# Patient Record
Sex: MALE | Race: ASIAN | NOT HISPANIC OR LATINO | Employment: UNEMPLOYED | URBAN - METROPOLITAN AREA
[De-identification: names, ages, dates, MRNs, and addresses within clinical notes are randomized per-mention and may not be internally consistent; named-entity substitution may affect disease eponyms.]

---

## 2017-01-03 ENCOUNTER — OFFICE VISIT - HEALTHEAST (OUTPATIENT)
Dept: FAMILY MEDICINE | Facility: CLINIC | Age: 57
End: 2017-01-03

## 2017-01-03 ENCOUNTER — COMMUNICATION - HEALTHEAST (OUTPATIENT)
Dept: FAMILY MEDICINE | Facility: CLINIC | Age: 57
End: 2017-01-03

## 2017-01-03 DIAGNOSIS — E11.9 TYPE 2 DIABETES MELLITUS WITHOUT COMPLICATION (H): ICD-10-CM

## 2017-01-03 DIAGNOSIS — Z01.818 PREOP EXAMINATION: ICD-10-CM

## 2017-01-03 DIAGNOSIS — H26.9 CATARACT: ICD-10-CM

## 2017-01-03 DIAGNOSIS — E11.9 TYPE 2 DIABETES MELLITUS WITHOUT COMPLICATION, WITHOUT LONG-TERM CURRENT USE OF INSULIN (H): ICD-10-CM

## 2017-01-03 LAB — HBA1C MFR BLD: 12.8 % (ref 3.5–6)

## 2017-01-03 RX ORDER — PREDNISOLONE ACETATE 10 MG/ML
SUSPENSION/ DROPS OPHTHALMIC
Status: SHIPPED | COMMUNITY
Start: 2016-12-10 | End: 2022-12-21

## 2017-01-03 RX ORDER — MOXIFLOXACIN HCL 0.5 %
DROPS OPHTHALMIC (EYE)
Status: SHIPPED | COMMUNITY
Start: 2016-12-10 | End: 2022-12-21

## 2017-01-03 RX ORDER — KETOROLAC TROMETHAMINE 4 MG/ML
SOLUTION/ DROPS OPHTHALMIC
Status: SHIPPED | COMMUNITY
Start: 2016-12-10 | End: 2022-12-21

## 2017-01-03 ASSESSMENT — MIFFLIN-ST. JEOR: SCORE: 1307.74

## 2017-05-09 ENCOUNTER — AMBULATORY - HEALTHEAST (OUTPATIENT)
Dept: EDUCATION SERVICES | Facility: CLINIC | Age: 57
End: 2017-05-09

## 2021-05-30 VITALS — WEIGHT: 130 LBS | HEIGHT: 64 IN | BODY MASS INDEX: 22.2 KG/M2

## 2021-06-08 NOTE — PROGRESS NOTES
Assessment/Plan:      Visit for Preoperative Exam.      1. Preop examination  Glycosylated Hemoglobin A1c    Hemoglobin   2. Cataract     3. Type 2 diabetes mellitus without complication     4. Type 2 diabetes mellitus without complication, without long-term current use of insulin  metFORMIN (GLUCOPHAGE) 500 MG tablet    Ambulatory referral to Diabetes Education (Existing Diagnosis)         This is a 57 yo male with longstanding poorly controlled diabetes (doesn't check blood sugars at home and last A1c > 14.0 - on 12/7/16, now 12.8).  Has vision impairing cataracts - scheduled for surgical repair.  Other than poorly controlled sugars, OK for surgery.  Will increase patient's Metformin treatment from 500 mg BID to 1000 mg BID and refer to diabetes educator as well.  Ok from my standpoint for surgery - discussed with patient that his surgery may be delayed by surgeon/anesthesiologist due to poorly controlled blood sugars.      Subjective:     Scheduled Procedure: Bilateral cataract surgery  Surgery Date:  01/09/2017  Surgery Location:  [facility]  Surgeon:  Dr. Rodriguez    57 yo male with bilateral cataract, scheduled for right cataract repair on 1/9/17 and left cataract repair on 2/6/17.  Has vision impairment related to cataracts.  Also has underlying diabetes - poorly controlled.      Current Outpatient Prescriptions   Medication Sig Dispense Refill     metFORMIN (GLUCOPHAGE) 500 MG tablet Take 2 tablets (1,000 mg total) by mouth 2 (two) times a day with meals. 120 tablet 1     ketorolac (ACULAR LS) 0.4 % Drop        prednisoLONE acetate (PRED-FORTE) 1 % ophthalmic suspension        VIGAMOX 0.5 % ophthalmic solution        No current facility-administered medications for this visit.        No Known Allergies      There is no immunization history on file for this patient.    Patient Active Problem List   Diagnosis     Type 2 diabetes mellitus without complication     Cataract       History reviewed. No  pertinent past medical history.    Social History     Social History     Marital status: Single     Spouse name: N/A     Number of children: N/A     Years of education: N/A     Occupational History     not currently employed      Social History Main Topics     Smoking status: Current Every Day Smoker     Types: Cigarettes     Smokeless tobacco: Never Used      Comment: 10 cigs per day     Alcohol use No     Drug use: No     Sexual activity: No     Other Topics Concern     Not on file     Social History Narrative    Lives with girlfriend; shares home with sister    No children    Born in [facility], spent time in [place]; to  1980       Past Surgical History   Procedure Laterality Date     No past surgeries         History of Present Illness  Recent Health  Fever: no  Chills: no  Fatigue: no  Chest Pain: no  Cough: no  Dyspnea: no  Urinary Frequency: yes, a little more than usual  Nausea: no  Vomiting: no  Diarrhea: no  Abdominal Pain: no  Easy Bruising: no  Lower Extremity Swelling: no  Poor Exercise Tolerance: no    Most recent Health Maintenance Visit:  6 month(s) ago    Pertinent History  Prior Anesthesia: no  Previous Anesthesia Reaction:  unknown  Diabetes: yes  Cardiovascular Disease: no  Pulmonary Disease: no  Renal Disease: no  GI Disease: no  Sleep Apnea: no  Thromboembolic Problems: no  Clotting Disorder: no  Bleeding Disorder: no  Transfusion Reaction: unknown  Impaired Immunity: no  Steroid use in the last 6 months: no  Frequent Aspirin use: no    Family history of   Family History   Problem Relation Age of Onset     Diabetes type II Mother          Social history of patient does not wear denture or partial plates, the patient refuses transfusion and there are no concerns regarding care after surgery    After surgery, the patient plans to recover at home with family.    Review of Systems  Pertinent positives as noted in HPI; otherwise 12 point ROS negative.            Objective:         Vitals:  "   01/03/17 0948   BP: 115/56   Pulse: 96   Resp: 20   Temp: 98.1  F (36.7  C)   TempSrc: Oral   Weight: 130 lb (59 kg)   Height: 5' 3.5\" (1.613 m)       Physical Exam:  EXAM:  Visit Vitals     /56 (Patient Site: Left Arm, Patient Position: Sitting, Cuff Size: Adult Regular)     Pulse 96     Temp 98.1  F (36.7  C) (Oral)     Resp 20     Ht 5' 3.5\" (1.613 m)     Wt 130 lb (59 kg)     BMI 22.67 kg/m2      Gen:  NAD, appears well, well-hydrated  HEENT:  TMs nl, oropharynx benign, nasal mucosa nl, conjunctiva clear, bilateral cataracts  Neck:  Supple, no adenopathy, no thyromegaly, no carotid bruits, no JVD  Lungs:  Clear to auscultation bilaterally  Cor:  RRR no murmur  Abd:  Soft, nontender, BS+, no masses, no guarding or rebound, no HSM  Extr:  Neg.  Neuro:  No asymmetry  Skin:  Warm/dry         Results for orders placed or performed in visit on 01/03/17   Glycosylated Hemoglobin A1c   Result Value Ref Range    Hemoglobin A1c 12.8 (H) 3.5 - 6.0 %   Hemoglobin   Result Value Ref Range    Hemoglobin 14.0 14.0 - 18.0 g/dL       "

## 2021-06-10 NOTE — PROGRESS NOTES
Patient did not show for today's dm education appt.  Marquise in house  called patient yesterday to leave reminder message regarding today's appt, unable to reach, left message.  Called today given no show, states he is no longer coming to the [facility] for his medical care, seeing Dr. Souza.  Will contact registrars to update profile.

## 2022-07-11 ENCOUNTER — TRANSFERRED RECORDS (OUTPATIENT)
Dept: HEALTH INFORMATION MANAGEMENT | Facility: CLINIC | Age: 62
End: 2022-07-11

## 2022-10-25 ENCOUNTER — TRANSFERRED RECORDS (OUTPATIENT)
Dept: HEALTH INFORMATION MANAGEMENT | Facility: CLINIC | Age: 62
End: 2022-10-25

## 2022-12-19 ENCOUNTER — OFFICE VISIT (OUTPATIENT)
Dept: FAMILY MEDICINE | Facility: CLINIC | Age: 62
End: 2022-12-19
Payer: COMMERCIAL

## 2022-12-19 VITALS
RESPIRATION RATE: 20 BRPM | SYSTOLIC BLOOD PRESSURE: 175 MMHG | BODY MASS INDEX: 24.41 KG/M2 | DIASTOLIC BLOOD PRESSURE: 96 MMHG | OXYGEN SATURATION: 100 % | HEART RATE: 99 BPM | WEIGHT: 140 LBS

## 2022-12-19 DIAGNOSIS — E11.9 TYPE 2 DIABETES MELLITUS WITHOUT COMPLICATION, WITH LONG-TERM CURRENT USE OF INSULIN (H): ICD-10-CM

## 2022-12-19 DIAGNOSIS — Z01.818 PREOP GENERAL PHYSICAL EXAM: Primary | ICD-10-CM

## 2022-12-19 DIAGNOSIS — Z11.4 SCREENING FOR HIV (HUMAN IMMUNODEFICIENCY VIRUS): ICD-10-CM

## 2022-12-19 DIAGNOSIS — Z13.220 SCREENING FOR HYPERLIPIDEMIA: ICD-10-CM

## 2022-12-19 DIAGNOSIS — Z11.59 NEED FOR HEPATITIS C SCREENING TEST: ICD-10-CM

## 2022-12-19 DIAGNOSIS — N18.31 STAGE 3A CHRONIC KIDNEY DISEASE (H): ICD-10-CM

## 2022-12-19 DIAGNOSIS — Z79.4 TYPE 2 DIABETES MELLITUS WITHOUT COMPLICATION, WITH LONG-TERM CURRENT USE OF INSULIN (H): ICD-10-CM

## 2022-12-19 DIAGNOSIS — I10 BENIGN ESSENTIAL HYPERTENSION: ICD-10-CM

## 2022-12-19 PROBLEM — L08.9 FOOT INFECTION: Status: ACTIVE | Noted: 2022-08-11

## 2022-12-19 PROBLEM — K20.80 ESOPHAGITIS, LOS ANGELES GRADE D: Status: ACTIVE | Noted: 2019-09-29

## 2022-12-19 PROBLEM — E11.10 DKA (DIABETIC KETOACIDOSIS) (H): Status: ACTIVE | Noted: 2019-09-29

## 2022-12-19 LAB — HBA1C MFR BLD: 7.5 % (ref 0–5.6)

## 2022-12-19 PROCEDURE — 36415 COLL VENOUS BLD VENIPUNCTURE: CPT

## 2022-12-19 PROCEDURE — 80048 BASIC METABOLIC PNL TOTAL CA: CPT

## 2022-12-19 PROCEDURE — 87389 HIV-1 AG W/HIV-1&-2 AB AG IA: CPT

## 2022-12-19 PROCEDURE — 86803 HEPATITIS C AB TEST: CPT

## 2022-12-19 PROCEDURE — 80061 LIPID PANEL: CPT

## 2022-12-19 PROCEDURE — 83036 HEMOGLOBIN GLYCOSYLATED A1C: CPT

## 2022-12-19 PROCEDURE — 99204 OFFICE O/P NEW MOD 45 MIN: CPT | Mod: GC

## 2022-12-19 RX ORDER — LOSARTAN POTASSIUM 25 MG/1
25 TABLET ORAL DAILY
Qty: 30 TABLET | Refills: 0 | Status: SHIPPED | OUTPATIENT
Start: 2022-12-19 | End: 2023-01-18

## 2022-12-19 RX ORDER — INSULIN GLARGINE 100 [IU]/ML
28 INJECTION, SOLUTION SUBCUTANEOUS AT BEDTIME
COMMUNITY
End: 2024-02-14

## 2022-12-19 NOTE — PROGRESS NOTES
[facility] [address] [place] [address]  Phone: [phone]  Fax: [phone]  Primary Provider: Abhi Painting  Pre-op Performing Provider: ABHI PAINTING  [id number]  PREOPERATIVE EVALUATION:  Today's date: 12/19/2022    Chace Lane is a 62 year old male who presents for a preoperative evaluation.    Surgical Information:  Surgery/Procedure: Cataract Procedure  Surgery Location: [facility]  Surgeon: Dr. Borrego  Surgery Date: 1/20/23  Time of Surgery: morning  Where patient plans to recover: At home with family  Fax number for surgical facility: [phone]    Type of Anesthesia Anticipated: Local    Assessment & Plan     The proposed surgical procedure is considered LOW risk.    Preop general physical exam  Patient here to establish care and pre-op exam for cataract surgery. Previously saw Dr. Armond Souza who is now retired. Patient undergoing cataract repair in a month. Patient cleared for surgery. Low risk procedure. Does not need to hold any medications. See below for further chronic disease discussion.    Benign essential hypertension  Patient reports having blood pressure medications at home but doesn't take them. Review of transferred records show SBP of 150s and multiple office visits with previous PCP. Discussed importance of medication adherence even if feeling well. Patient unsure what BP medication he has at home and not in records. Patient to dispose of home bp medication. Will start Losartan and follow-up in 2 weeks for BP check and repeat BMP.  - losartan (COZAAR) 25 MG tablet; Take 1 tablet (25 mg) by mouth daily    Type 2 diabetes mellitus without complication (H)  Patient on 34U of lantus per report and metformin. Last A1c on paperchart was 8.2 after recent initiation of lantus. Will repeat A1c today.   - Hemoglobin A1c; Future  - Hemoglobin A1c    Stage 3a chronic kidney disease (H)  Review of paper records shows elevated Cr of 1.5 on multiple  checks. Patient unaware of this. Briefly discussed CKD in setting of hypertension and DM. Will follow-up BMP at next visit given losartan initiation.   - Basic metabolic panel; Future  - Basic metabolic panel    Screening for HIV (human immunodeficiency virus)  Due for routine screening which was obtained today.  - HIV Antigen Antibody Combo; Future  - HIV Antigen Antibody Combo    Need for hepatitis C screening test  Due for routine screening which was obtained today.  - Hepatitis C antibody; Future  - Hepatitis C antibody    Screening for hyperlipidemia  Due for routine screening which was obtained today. Lipid from previous provider showed cholesterol of 202. Will recheck lipid panel today.  - Lipid Profile; Future  - Lipid Profile      Risks and Recommendations:  The patient has the following additional risks and recommendations for perioperative complications:   - No identified additional risk factors other than previously addressed    No medications needing to be held.    RECOMMENDATION:  APPROVAL GIVEN to proceed with proposed procedure, without further diagnostic evaluation.      Subjective     HPI related to upcoming procedure:   Blurred vision for some time that's now worsening. Diagnosed with cataracts 5+ years ago.     Also here to establish care. Patient with known hypertension, DM, and CKD. No BP medications. On lantus 34U and metformin    No flowsheet data found.    Health Care Directive:  Patient does not have a Health Care Directive or Living Will: Discussed advance care planning with patient; information given to patient to review.    Status of Chronic Conditions:  DIABETES - Patient has a longstanding history of DiabetesType Type II . Patient is being treated with oral agents and insulin injections and denies significant side effects. Control has been fair. Complicating factors include but are not limited to: hypertension and chronic kidney disease.     HYPERTENSION - Patient has longstanding  history of HTN , currently denies any symptoms referable to elevated blood pressure. Specifically denies chest pain, palpitations, dyspnea, orthopnea, PND or peripheral edema. Blood pressure readings have not been in normal range. Not on medications.    RENAL INSUFFICIENCY - Patient has a longstanding history of moderate-severe chronic renal insufficiency. Last Cr 1.5.       Review of Systems  CONSTITUTIONAL: NEGATIVE for fever, chills, change in weight  ENT/MOUTH: NEGATIVE for ear, mouth and throat problems  RESP: NEGATIVE for significant cough or SOB  CV: NEGATIVE for chest pain, palpitations or peripheral edema    Patient Active Problem List    Diagnosis Date Noted     Type 2 diabetes mellitus without complication (H) 09/14/2016     Priority: Medium     Cataract 09/14/2016     Priority: Medium      No past medical history on file.  Past Surgical History:   Procedure Laterality Date     NO PAST SURGERIES       Current Outpatient Medications   Medication Sig Dispense Refill     insulin glargine (LANTUS VIAL) 100 UNIT/ML vial Inject 34 Units Subcutaneous At Bedtime       metFORMIN (GLUCOPHAGE) 500 MG tablet [METFORMIN (GLUCOPHAGE) 500 MG TABLET] Take 2 tablets (1,000 mg total) by mouth 2 (two) times a day with meals. 120 tablet 1     ketorolac (ACULAR LS) 0.4 % Drop [KETOROLAC (ACULAR LS) 0.4 % DROP]  (Patient not taking: Reported on 12/19/2022)       prednisoLONE acetate (PRED-FORTE) 1 % ophthalmic suspension [PREDNISOLONE ACETATE (PRED-FORTE) 1 % OPHTHALMIC SUSPENSION]  (Patient not taking: Reported on 12/19/2022)       VIGAMOX 0.5 % ophthalmic solution [VIGAMOX 0.5 % OPHTHALMIC SOLUTION]  (Patient not taking: Reported on 12/19/2022)         No Known Allergies     Social History     Tobacco Use     Smoking status: Every Day     Types: Cigarettes     Smokeless tobacco: Never     Tobacco comments:     10 cigs per day   Substance Use Topics     Alcohol use: No       History   Drug Use No         Objective     BP (!)  175/96   Pulse 99   Resp 20   Wt 63.5 kg (140 lb)   SpO2 100%   BMI 24.41 kg/m      Physical Exam  GENERAL APPEARANCE: healthy, alert and no distress  HENT: ear canals and TM's normal and nose and mouth without ulcers or lesions  RESP: lungs clear to auscultation - no rales, rhonchi or wheezes  CV: regular rate and rhythm, normal S1 S2, no S3 or S4 and no murmur, click or rub   ABDOMEN: soft, nontender, no HSM or masses and bowel sounds normal  NEURO: Normal strength and tone, sensory exam grossly normal, mentation intact and speech normal    No results for input(s): HGB, PLT, INR, NA, POTASSIUM, CR, A1C in the last 40468 hours.     Diagnostics:  No labs were ordered during this visit.   No EKG required for low risk surgery (cataract, skin procedure, breast biopsy, etc).    Revised Cardiac Risk Index (RCRI):  The patient has the following serious cardiovascular risks for perioperative complications:   - No serious cardiac risks = 0 points     RCRI Interpretation: 0 points: Class I (very low risk - 0.4% complication rate)           Signed Electronically by: Dianne Painting MD  Copy of this evaluation report is provided to requesting physician.

## 2022-12-19 NOTE — LETTER
December 27, 2022      GORAN Lane  [address] [place] [address]        Dear ,    We are writing to inform you of your test results.    Thank you for visiting our clinic on Dec 19, 2022!     The result of your recent labwork has returned. As discussed your A1c is at 7.5 which is improved from your last check with Dr. Souza. Your kidney function is unchanged from last check with Dr. Souza. Cholesterol is improved from last check with Dr. Souza. HIV and Hepatitis C is negative which is good -- nothing further to do there.  I will see you at your follow-up visit in January for further blood pressure and diabetes discussion.    Resulted Orders   Basic metabolic panel   Result Value Ref Range    Sodium 137 136 - 145 mmol/L    Potassium 4.6 3.4 - 5.3 mmol/L    Chloride 103 98 - 107 mmol/L    Carbon Dioxide (CO2) 28 22 - 29 mmol/L    Anion Gap 6 (L) 7 - 15 mmol/L    Urea Nitrogen 33.5 (H) 8.0 - 23.0 mg/dL    Creatinine 1.55 (H) 0.67 - 1.17 mg/dL    Calcium 10.0 8.8 - 10.2 mg/dL    Glucose 199 (H) 70 - 99 mg/dL    GFR Estimate 51 (L) >60 mL/min/1.73m2      Comment:      Effective December 21, 2021 eGFRcr in adults is calculated using the 2021 CKD-EPI creatinine equation which includes age and gender ([employer] et al., NE, DOI: 10.1056/RAAVfx0693804)   Hemoglobin A1c   Result Value Ref Range    Hemoglobin A1C 7.5 (H) 0.0 - 5.6 %      Comment:      Normal <5.7%   Prediabetes 5.7-6.4%    Diabetes 6.5% or higher     Note: Adopted from ADA consensus guidelines.   Lipid Profile   Result Value Ref Range    Cholesterol 184 <200 mg/dL    Triglycerides 146 <150 mg/dL    Direct Measure HDL 48 >=40 mg/dL    LDL Cholesterol Calculated 107 (H) <=100 mg/dL    Non HDL Cholesterol 136 (H) <130 mg/dL    Narrative    Cholesterol  Desirable:  <200 mg/dL    Triglycerides  Normal:  Less than 150 mg/dL  Borderline High:  150-199 mg/dL  High:  200-499 mg/dL  Very High:  Greater than or equal to 500 mg/dL    Direct Measure HDL  Female:  Greater  than or equal to 50 mg/dL   Male:  Greater than or equal to 40 mg/dL    LDL Cholesterol  Desirable:  <100mg/dL  Above Desirable:  100-129 mg/dL   Borderline High:  130-159 mg/dL   High:  160-189 mg/dL   Very High:  >= 190 mg/dL    Non HDL Cholesterol  Desirable:  130 mg/dL  Above Desirable:  130-159 mg/dL  Borderline High:  160-189 mg/dL  High:  190-219 mg/dL  Very High:  Greater than or equal to 220 mg/dL   HIV Antigen Antibody Combo   Result Value Ref Range    HIV Antigen Antibody Combo Nonreactive Nonreactive      Comment:      HIV-1 p24 Ag & HIV-1/HIV-2 Ab Not Detected   Hepatitis C antibody   Result Value Ref Range    Hepatitis C Antibody Nonreactive Nonreactive    Narrative    Assay performance characteristics have not been established for newborns, infants, and children.       If you have any questions or concerns, please call the clinic at the number listed above.       Sincerely,      Dr. Painting

## 2022-12-20 LAB
ANION GAP SERPL CALCULATED.3IONS-SCNC: 6 MMOL/L (ref 7–15)
BUN SERPL-MCNC: 33.5 MG/DL (ref 8–23)
CALCIUM SERPL-MCNC: 10 MG/DL (ref 8.8–10.2)
CHLORIDE SERPL-SCNC: 103 MMOL/L (ref 98–107)
CHOLEST SERPL-MCNC: 184 MG/DL
CREAT SERPL-MCNC: 1.55 MG/DL (ref 0.67–1.17)
DEPRECATED HCO3 PLAS-SCNC: 28 MMOL/L (ref 22–29)
GFR SERPL CREATININE-BSD FRML MDRD: 51 ML/MIN/1.73M2
GLUCOSE SERPL-MCNC: 199 MG/DL (ref 70–99)
HCV AB SERPL QL IA: NONREACTIVE
HDLC SERPL-MCNC: 48 MG/DL
HIV 1+2 AB+HIV1 P24 AG SERPL QL IA: NONREACTIVE
LDLC SERPL CALC-MCNC: 107 MG/DL
NONHDLC SERPL-MCNC: 136 MG/DL
POTASSIUM SERPL-SCNC: 4.6 MMOL/L (ref 3.4–5.3)
SODIUM SERPL-SCNC: 137 MMOL/L (ref 136–145)
TRIGL SERPL-MCNC: 146 MG/DL

## 2022-12-21 PROBLEM — N18.9 CHRONIC KIDNEY DISEASE, UNSPECIFIED CKD STAGE: Status: ACTIVE | Noted: 2022-12-21

## 2022-12-28 NOTE — PATIENT INSTRUCTIONS
Pre-op faxed to fax number :  [phone]   Location :  [facility]  Date of Surgery :  1/20/2023  By/Date :  Nathaly 12/28/2022

## 2023-01-18 ENCOUNTER — OFFICE VISIT (OUTPATIENT)
Dept: FAMILY MEDICINE | Facility: CLINIC | Age: 63
End: 2023-01-18
Payer: COMMERCIAL

## 2023-01-18 VITALS
RESPIRATION RATE: 20 BRPM | BODY MASS INDEX: 26.22 KG/M2 | OXYGEN SATURATION: 99 % | WEIGHT: 148 LBS | HEART RATE: 95 BPM | SYSTOLIC BLOOD PRESSURE: 187 MMHG | DIASTOLIC BLOOD PRESSURE: 98 MMHG | HEIGHT: 63 IN

## 2023-01-18 DIAGNOSIS — I10 BENIGN ESSENTIAL HYPERTENSION: ICD-10-CM

## 2023-01-18 DIAGNOSIS — Z01.818 PREOP GENERAL PHYSICAL EXAM: Primary | ICD-10-CM

## 2023-01-18 PROCEDURE — 99214 OFFICE O/P EST MOD 30 MIN: CPT | Mod: GC

## 2023-01-18 RX ORDER — LOSARTAN POTASSIUM 25 MG/1
25 TABLET ORAL DAILY
Qty: 30 TABLET | Refills: 0 | Status: SHIPPED | OUTPATIENT
Start: 2023-01-18 | End: 2023-03-22

## 2023-01-18 NOTE — PROGRESS NOTES
[facility] [address] [place] [address]  Phone: [phone]  Fax: [phone]  Primary Provider: Abhi Sesay  Pre-op Performing Provider: ABHI SESAY    PREOPERATIVE EVALUATION:  Today's date: 1/18/2023    Chace Lane is a 62 year old male who presents for a preoperative evaluation.    Surgical Information:  Surgery/Procedure: Left eye Cataract   Surgery Location: [facility]  Surgeon: Unknown  Surgery Date: 2/8/2023  Time of Surgery: unknown  Where patient plans to recover: At home with family  Fax number for surgical facility: [phone]    Type of Anesthesia Anticipated: Local    Assessment & Plan     The proposed surgical procedure is considered LOW risk.    Preop general physical exam  Patient here for 2nd pre-op for left eye cataract surgery on 2/8. Discussed halving insulin dose if asked to fast prior to surgery. Otherwise continue all other medications.   -cleared for surgery  -half insulin if fasting  -continue all other medications    Benign essential hypertension  Elevated blood pressure at last visit. Has not started BP medications prescribed last visit. Will refill medication. Discussed risks/complications of hypertension. Encouraged to continue walking and watching food intake as he already has been.  - losartan (COZAAR) 25 MG tablet; Take 1 tablet (25 mg) by mouth daily  - follow up in 2 weeks for BMP and BP recheck      RECOMMENDATION:  APPROVAL GIVEN to proceed with proposed procedure, without further diagnostic evaluation.      Subjective     HPI related to upcoming procedure:   Blurred vision for some time that's now worsening. Diagnosed with cataracts 5+ years ago.     Saw at the end of December for right eye pre-op. Here today for left eye. No changes since that time. Unsure if he needs to fast. Plans to ask surgery prior to procedure.    Preop Questions 1/18/2023   1. Have you ever had a heart attack or stroke? No   2. Have you ever had  surgery on your heart or blood vessels, such as a stent placement, a coronary artery bypass, or surgery on an artery in your head, neck, heart, or legs? No   3. Do you have chest pain with activity? No   4. Do you have a history of  heart failure? No   5. Do you currently have a cold, bronchitis or symptoms of other infection? No   6. Do you have a cough, shortness of breath, or wheezing? No   7. Do you or anyone in your family have previous history of blood clots? No   8. Do you or does anyone in your family have a serious bleeding problem such as prolonged bleeding following surgeries or cuts? No   9. Have you ever had problems with anemia or been told to take iron pills? No   10. Have you had any abnormal blood loss such as black, tarry or bloody stools? No   11. Have you ever had a blood transfusion? No   12. Are you willing to have a blood transfusion if it is medically needed before, during, or after your surgery? NO -    13. Have you or any of your relatives ever had problems with anesthesia? No   14. Do you have sleep apnea, excessive snoring or daytime drowsiness? No   15. Do you have any artifical heart valves or other implanted medical devices like a pacemaker, defibrillator, or continuous glucose monitor? No   16. Do you have artificial joints? No   17. Are you allergic to latex? No       Health Care Directive:  Patient does not have a Health Care Directive or Living Will: Discussed advance care planning with patient; however, patient declined at this time.    Preoperative Review of :   reviewed - no record of controlled substances prescribed.      Review of Systems  CONSTITUTIONAL: NEGATIVE for fever, chills, change in weight  ENT/MOUTH: NEGATIVE for ear, mouth and throat problems  RESP: NEGATIVE for significant cough or SOB  CV: NEGATIVE for chest pain, palpitations or peripheral edema    Patient Active Problem List    Diagnosis Date Noted     Chronic kidney disease, unspecified CKD stage  "12/21/2022     Priority: Medium     Foot infection 08/11/2022     Priority: Medium     DKA (diabetic ketoacidosis) (H) 09/29/2019     Priority: Medium     Esophagitis, [place] grade D 09/29/2019     Priority: Medium     Benign essential hypertension 09/29/2019     Priority: Medium     Type 2 diabetes mellitus without complication (H) 09/14/2016     Priority: Medium     Cataract 09/14/2016     Priority: Medium      No past medical history on file.  Past Surgical History:   Procedure Laterality Date     NO PAST SURGERIES       Current Outpatient Medications   Medication Sig Dispense Refill     losartan (COZAAR) 25 MG tablet Take 1 tablet (25 mg) by mouth daily 30 tablet 0     insulin glargine (LANTUS VIAL) 100 UNIT/ML vial Inject 34 Units Subcutaneous At Bedtime       metFORMIN (GLUCOPHAGE) 500 MG tablet [METFORMIN (GLUCOPHAGE) 500 MG TABLET] Take 2 tablets (1,000 mg total) by mouth 2 (two) times a day with meals. 120 tablet 1       No Known Allergies     Social History     Tobacco Use     Smoking status: Former     Types: Cigarettes     Smokeless tobacco: Never     Tobacco comments:     10 cigs per day   Substance Use Topics     Alcohol use: No     Family History   Problem Relation Age of Onset     Diabetes Type 2  Mother      History   Drug Use No         Objective     BP (!) 176/91   Pulse 95   Resp 20   Ht 1.6 m (5' 3\")   Wt 67.1 kg (148 lb)   SpO2 99%   BMI 26.22 kg/m      Physical Exam  GENERAL APPEARANCE: healthy, alert and no distress  HENT: ear canals and TM's normal and nose and mouth without ulcers or lesions  RESP: lungs clear to auscultation - no rales, rhonchi or wheezes  CV: regular rate and rhythm, normal S1 S2, no S3 or S4 and no murmur, click or rub   ABDOMEN: soft, nontender, no HSM or masses and bowel sounds normal  NEURO: Normal strength and tone, sensory exam grossly normal, mentation intact and speech normal    Recent Labs   Lab Test 12/19/22  1543      POTASSIUM 4.6   CR 1.55* "   A1C 7.5*        Diagnostics:  No labs were ordered during this visit.   No EKG required for low risk surgery (cataract, skin procedure, breast biopsy, etc).    Revised Cardiac Risk Index (RCRI):  The patient has the following serious cardiovascular risks for perioperative complications:   - No serious cardiac risks = 0 points     RCRI Interpretation: 0 points: Class I (very low risk - 0.4% complication rate)           Signed Electronically by: Dianne Painting MD  Copy of this evaluation report is provided to requesting physician.

## 2023-01-18 NOTE — PROGRESS NOTES
Preceptor Attestation:   Patient seen, evaluated and discussed with the resident. I have verified the content of the note, which accurately reflects my assessment of the patient and the plan of care.    Supervising Physician:Debbie Spangler MD    [facility]

## 2023-01-19 NOTE — PATIENT INSTRUCTIONS
Pre-op faxed to fax number :  [phone]  Location :  [facility]   Date of Surgery :  02.08.23  By/Date :  Nathaly 01.19.2023

## 2023-03-21 PROBLEM — N18.2 CKD (CHRONIC KIDNEY DISEASE), STAGE II: Status: ACTIVE | Noted: 2022-12-21

## 2023-03-21 PROBLEM — E11.10 DKA (DIABETIC KETOACIDOSIS) (H): Status: RESOLVED | Noted: 2019-09-29 | Resolved: 2023-03-21

## 2023-03-21 NOTE — PROGRESS NOTES
Assessment & Plan     1. Type 2 diabetes mellitus without complication, with long-term current use of insulin (H)  A1c at goal today! At 6.5 (goal <7). On metformin and glargine. Marginal weight gain but otherwise no hypoglycemia nor other overt harms. He is happy with his regimen and wishes to continue for now.   - Labs    - Hemoglobin A1c; Future   - Basic metabolic panel; Future   - Albumin Random Urine Quantitative with Creat Ratio; Future  - FL FOOT EXAM NO CHARGE  - Has already seen an eye doc -- requested records today   - Refill: metFORMIN (GLUCOPHAGE) 500 MG tablet; Take 2 tablets (1,000 mg) by mouth 2 times daily (with meals)  Dispense: 120 tablet; Refill: 1  - Continue glargine at present dose for now    Continue glucose monitoring while on insulin    Could consider alternatives such as GLP1 vs SGLT2 vs other    He wishes to continue glargine for now   - Follow up visit in a few weeks to discuss remainder labs and next steps     Benign essential hypertension  BP at goal today (116/66)! Goal <140/90 at least. This is dramatic improvement from prior; praised! Now adherent to ARB. Discussed elevated ASCVD risk at 15% (though better than past when he had uncontrolled HTN); candidate for statin therapy.   - Continue home monitoring   - Refill: losartan (COZAAR) 25 MG tablet; Take 1 tablet (25 mg) by mouth daily  Dispense: 30 tablet; Refill: 0  - New: atorvastatin at high intensity     most recent check; goal at least <100, ideally 70    Discussed risks/benefits of following med, verbalized understanding and in agreement to starting    Emphasized muscle soreness; notify me if problems    Could benefit from liver panel in near future   - Of note, discussed ASA    Not a candidate for starting given age and most recent    However, I found he had this on his list from when he was 62 yo    Will need to clarify, may be reasonable to continue if he had already started it     CKD (chronic kidney disease),  stage II  Recheck renal function given ARB   - Basic metabolic panel      DO [facility]    Precepted with Dr. Arelis Victor   Y is a 62 year old M hx HTN, T2DM presenting for the following health issues:  Hypertension and Refill Request (Medications might need to be refilled pt stated will talk to Dr)    HPI     BP recheck   Last BP on file 187/98   Historical: never at goal. 157-187 systolic during visits here   At home: took it twice but forgot the numbers.   Today: 116/66    Diet (salt, rice): no change. Does limit salts and sweets.   Exercise: walking more / some jogging.    Other factors: none. Stress under control   Meds:    Losartan 25 mg daily - taking    Willing to start a statin      Diabetes   Last A1c: 7.5 in 12/2022   Lifestyle (as above). Additions: none   Meds:    Metformin     Adherent yes     Dose/frequency: 1 tablet of 500 mg metformin per day    Lantus     Adherent yes     Dose/frequency: 34 unit(s) per day     Needs: foot exam    Did see eye doctor. Has 2 cataracts. Got the right one done. Waiting to get the left.    Vaccines: not open to these today      Before today:   The 10-year ASCVD risk score (Emily REDMAN, et al., 2019) is: 31.9%    Values used to calculate the score:      Age: 62 years      Sex: Male      Is Non- : No      Diabetic: Yes      Tobacco smoker: No      Systolic Blood Pressure: 187 mmHg      Is BP treated: Yes      HDL Cholesterol: 48 mg/dL      Total Cholesterol: 184 mg/dL      After today:   The 10-year ASCVD risk score (Emily REDMAN, et al., 2019) is: 15%    Values used to calculate the score:      Age: 62 years      Sex: Male      Is Non- : No      Diabetic: Yes      Tobacco smoker: No      Systolic Blood Pressure: 116 mmHg      Is BP treated: Yes      HDL Cholesterol: 48 mg/dL      Total Cholesterol: 184 mg/dL      Review of Systems   Constitutional, HEENT,  cardiovascular, pulmonary, GI, , musculoskeletal, neuro, skin, endocrine and psych systems are negative, except as otherwise noted.      Objective    /66 (BP Location: Right arm, Patient Position: Sitting, Cuff Size: Adult Regular)   Pulse 93   Wt 65.8 kg (145 lb 1.9 oz)   SpO2 100%   BMI 25.71 kg/m    Body mass index is 25.71 kg/m .     Wt Readings from Last 4 Encounters:   03/22/23 65.8 kg (145 lb 1.9 oz)   01/18/23 67.1 kg (148 lb)   12/19/22 63.5 kg (140 lb)   01/03/17 59 kg (130 lb)         Physical Exam     Gen: Pleasant. No distress. Very jovial.    HEENT: MMM.   Neck: No overt asymmetry.   CV: Appears well-perfused. RRR. No murmur.   Resp: Breathing comfortably on room air. Lungs clear to auscultation bilaterally without wheeze or crackle.   Abd: Non-distended   Ext: No edema or overt asymmetry/deformity.   Skin: No overt rash on easily visualized skin.   Neuro: Non-focal.   Psych: Calm.   Diabetic foot exam: normal DP and PT pulses, no trophic changes or ulcerative lesions and normal sensory exam

## 2023-03-22 ENCOUNTER — OFFICE VISIT (OUTPATIENT)
Dept: FAMILY MEDICINE | Facility: CLINIC | Age: 63
End: 2023-03-22
Payer: COMMERCIAL

## 2023-03-22 VITALS
WEIGHT: 145.12 LBS | DIASTOLIC BLOOD PRESSURE: 66 MMHG | BODY MASS INDEX: 25.71 KG/M2 | OXYGEN SATURATION: 100 % | HEART RATE: 93 BPM | SYSTOLIC BLOOD PRESSURE: 116 MMHG

## 2023-03-22 DIAGNOSIS — E11.9 TYPE 2 DIABETES MELLITUS WITHOUT COMPLICATION, WITH LONG-TERM CURRENT USE OF INSULIN (H): Primary | ICD-10-CM

## 2023-03-22 DIAGNOSIS — I10 BENIGN ESSENTIAL HYPERTENSION: ICD-10-CM

## 2023-03-22 DIAGNOSIS — Z79.4 TYPE 2 DIABETES MELLITUS WITHOUT COMPLICATION, WITH LONG-TERM CURRENT USE OF INSULIN (H): Primary | ICD-10-CM

## 2023-03-22 DIAGNOSIS — N18.2 CKD (CHRONIC KIDNEY DISEASE), STAGE II: ICD-10-CM

## 2023-03-22 LAB
ANION GAP SERPL CALCULATED.3IONS-SCNC: 10 MMOL/L (ref 7–15)
BUN SERPL-MCNC: 28.8 MG/DL (ref 8–23)
CALCIUM SERPL-MCNC: 9.4 MG/DL (ref 8.8–10.2)
CHLORIDE SERPL-SCNC: 104 MMOL/L (ref 98–107)
CREAT SERPL-MCNC: 1.59 MG/DL (ref 0.67–1.17)
CREAT UR-MCNC: 238 MG/DL
DEPRECATED HCO3 PLAS-SCNC: 24 MMOL/L (ref 22–29)
GFR SERPL CREATININE-BSD FRML MDRD: 49 ML/MIN/1.73M2
GLUCOSE SERPL-MCNC: 160 MG/DL (ref 70–99)
HBA1C MFR BLD: 6.5 % (ref 0–5.6)
MICROALBUMIN UR-MCNC: 1194 MG/L
MICROALBUMIN/CREAT UR: 501.68 MG/G CR (ref 0–17)
POTASSIUM SERPL-SCNC: 4.6 MMOL/L (ref 3.4–5.3)
SODIUM SERPL-SCNC: 138 MMOL/L (ref 136–145)

## 2023-03-22 PROCEDURE — 99207 PR FOOT EXAM NO CHARGE: CPT | Performed by: STUDENT IN AN ORGANIZED HEALTH CARE EDUCATION/TRAINING PROGRAM

## 2023-03-22 PROCEDURE — 36415 COLL VENOUS BLD VENIPUNCTURE: CPT | Performed by: STUDENT IN AN ORGANIZED HEALTH CARE EDUCATION/TRAINING PROGRAM

## 2023-03-22 PROCEDURE — 82570 ASSAY OF URINE CREATININE: CPT | Performed by: STUDENT IN AN ORGANIZED HEALTH CARE EDUCATION/TRAINING PROGRAM

## 2023-03-22 PROCEDURE — 82043 UR ALBUMIN QUANTITATIVE: CPT | Performed by: STUDENT IN AN ORGANIZED HEALTH CARE EDUCATION/TRAINING PROGRAM

## 2023-03-22 PROCEDURE — 80048 BASIC METABOLIC PNL TOTAL CA: CPT | Performed by: STUDENT IN AN ORGANIZED HEALTH CARE EDUCATION/TRAINING PROGRAM

## 2023-03-22 PROCEDURE — 99214 OFFICE O/P EST MOD 30 MIN: CPT | Mod: GC | Performed by: STUDENT IN AN ORGANIZED HEALTH CARE EDUCATION/TRAINING PROGRAM

## 2023-03-22 PROCEDURE — 83036 HEMOGLOBIN GLYCOSYLATED A1C: CPT | Performed by: STUDENT IN AN ORGANIZED HEALTH CARE EDUCATION/TRAINING PROGRAM

## 2023-03-22 RX ORDER — ATORVASTATIN CALCIUM 40 MG/1
40 TABLET, FILM COATED ORAL DAILY
Qty: 90 TABLET | Refills: 1 | Status: SHIPPED | OUTPATIENT
Start: 2023-03-22 | End: 2024-02-14

## 2023-03-22 RX ORDER — LOSARTAN POTASSIUM 25 MG/1
25 TABLET ORAL DAILY
Qty: 30 TABLET | Refills: 0 | Status: SHIPPED | OUTPATIENT
Start: 2023-03-22 | End: 2023-06-26

## 2023-03-26 PROBLEM — L08.9 FOOT INFECTION: Status: RESOLVED | Noted: 2022-08-11 | Resolved: 2023-03-26

## 2023-03-27 NOTE — PROGRESS NOTES
Preceptor Attestation:  Patient's case reviewed and discussed with Tree Godinez MD resident and I evaluated the patient. I agree with written assessment and plan of care.  Supervising Physician:  Milind Infante MD, MD MURILLO  [facility]

## 2023-03-29 ENCOUNTER — PATIENT OUTREACH (OUTPATIENT)
Dept: CARE COORDINATION | Facility: CLINIC | Age: 63
End: 2023-03-29

## 2023-03-29 ENCOUNTER — OFFICE VISIT (OUTPATIENT)
Dept: FAMILY MEDICINE | Facility: CLINIC | Age: 63
End: 2023-03-29
Payer: COMMERCIAL

## 2023-03-29 VITALS
WEIGHT: 145 LBS | HEART RATE: 96 BPM | OXYGEN SATURATION: 100 % | DIASTOLIC BLOOD PRESSURE: 64 MMHG | BODY MASS INDEX: 25.69 KG/M2 | SYSTOLIC BLOOD PRESSURE: 114 MMHG

## 2023-03-29 DIAGNOSIS — N18.31 STAGE 3A CHRONIC KIDNEY DISEASE (H): ICD-10-CM

## 2023-03-29 DIAGNOSIS — H25.9 SENILE CATARACT OF LEFT EYE, UNSPECIFIED AGE-RELATED CATARACT TYPE: ICD-10-CM

## 2023-03-29 DIAGNOSIS — E11.29 TYPE 2 DIABETES MELLITUS WITH MICROALBUMINURIA, WITH LONG-TERM CURRENT USE OF INSULIN (H): Primary | ICD-10-CM

## 2023-03-29 DIAGNOSIS — Z01.818 PREOP GENERAL PHYSICAL EXAM: ICD-10-CM

## 2023-03-29 DIAGNOSIS — R80.9 TYPE 2 DIABETES MELLITUS WITH MICROALBUMINURIA, WITH LONG-TERM CURRENT USE OF INSULIN (H): Primary | ICD-10-CM

## 2023-03-29 DIAGNOSIS — I10 BENIGN ESSENTIAL HYPERTENSION: ICD-10-CM

## 2023-03-29 DIAGNOSIS — Z79.4 TYPE 2 DIABETES MELLITUS WITH MICROALBUMINURIA, WITH LONG-TERM CURRENT USE OF INSULIN (H): Primary | ICD-10-CM

## 2023-03-29 PROCEDURE — 99214 OFFICE O/P EST MOD 30 MIN: CPT | Mod: GC | Performed by: STUDENT IN AN ORGANIZED HEALTH CARE EDUCATION/TRAINING PROGRAM

## 2023-03-29 NOTE — PROGRESS NOTES
[facility] [address] [place] [address]  Phone: [phone]  Fax: [phone]  Primary Provider: Dianne Painting  Pre-op Performing Provider: ALIYAH MARC    PREOPERATIVE EVALUATION:  Today's date: 3/29/2023    Chace Lane is a 62 year old male who presents for a preoperative evaluation.  Additional Questions 3/29/2023   Roomed by Ety     Surgical Information:  Surgery/Procedure: L eye Surgery  Surgery Location: [place]  Surgeon: don't know    Surgery Date: 4/5/23  Time of Surgery: don't know   Where patient plans to recover: At home with family  Fax number for surgical facility: he will let us know     Assessment & Plan     The proposed surgical procedure is considered LOW risk.    Preop general physical exam  Cataract of left eye, unspecified age-related cataract type  He sees an eye provider who diagnosed cataract and recommends surgery. Reasonable medical candidate / well optimized. Chronic medical conditions as listed below. There is no clear medical contraindication to cataract surgery.   -Continue eye drops at the instruction of ophtho with hold times as outlined by them   -Approval given to proceed with procedure without further eval  -Med recs as below      Type 2 diabetes mellitus with microalbuminuria, with long-term current use of insulin (H)  A1c <7 as of check last week. On metformin, insulin. Screening labs display albuminuria, which was not previously identified.   -Continue current meds in the short term    Would suggest revisiting regimen following his procedure to consider alternative meds to insulin given his great A1c control   -Did suggest doubling his losartan (to 50 mg per day) given newly found albuminuria    If feeling dizzy, go back to prior dose and notify me    Would consider SGLT2 in near future after surgery   -Suggest holding metformin day of surgery   -Suggest following with insulin in setting of operation:   Contact his  proceduralist to see if he will be NPO or not    If not NPO, continue current insulin day of surgery    If NPO, would dose reduce by 10-25% evening prior to procedure     Benign essential hypertension  BP at goal today <140/90 given diabetes/CKD. On ARB.   -Push ARB dose in setting of new albuminuria    Monitor carefully for hypotension   -Recommend holding the ARB on day of surgery -- emphasized to patient   -Continue statin day of procedure     Stage 3a chronic kidney disease (H)  Noted. Cr 1.6 with GFR 49 last week. In setting of often poorly controlled HTN / diabetes. However, both diab/HTN are improved recently. Of interest in setting of albuminuria  -Follow renal function   -Suggested he return shortly after his procedure to discuss this in more detail and consider further lab eval   -Consider SGLT2 in near future       Risks and Recommendations:  The patient has the following additional risks and recommendations for perioperative complications:  Diabetes:  - Patient is on insulin therapy; diabetic NPO guidelines provided and discussed.    Discussed medication hold times as above     RECOMMENDATION:  APPROVAL GIVEN to proceed with proposed procedure, without further diagnostic evaluation.        Subjective     HPI related to upcoming procedure:     Has cataract   Getting surgery on it   No hx BPH nor on alpha blockers     Preop Questions 3/29/2023   1. Have you ever had a heart attack or stroke? No   2. Have you ever had surgery on your heart or blood vessels, such as a stent placement, a coronary artery bypass, or surgery on an artery in your head, neck, heart, or legs? No   3. Do you have chest pain with activity? No   4. Do you have a history of  heart failure? No   5. Do you currently have a cold, bronchitis or symptoms of other infection? No   6. Do you have a cough, shortness of breath, or wheezing? No   7. Do you or anyone in your family have previous history of blood clots? No   8. Do you or does  anyone in your family have a serious bleeding problem such as prolonged bleeding following surgeries or cuts? No   9. Have you ever had problems with anemia or been told to take iron pills? No   10. Have you had any abnormal blood loss such as black, tarry or bloody stools? No   11. Have you ever had a blood transfusion? No   12. Are you willing to have a blood transfusion if it is medically needed before, during, or after your surgery? Yes   13. Have you or any of your relatives ever had problems with anesthesia? No   14. Do you have sleep apnea, excessive snoring or daytime drowsiness? No   15. Do you have any artifical heart valves or other implanted medical devices like a pacemaker, defibrillator, or continuous glucose monitor? No   16. Do you have artificial joints? No   17. Are you allergic to latex? No     Health Care Directive:  Patient does not have a Health Care Directive or Living Will: Discussed advance care planning with patient; however, patient declined at this time.    Preoperative Review of :   reviewed - no record of controlled substances prescribed.    Review of Systems  CONSTITUTIONAL: NEGATIVE for fever, chills, change in weight  ENT/MOUTH: NEGATIVE for ear, mouth and throat problems  RESP: NEGATIVE for significant cough or SOB  CV: NEGATIVE for chest pain, palpitations or peripheral edema    Patient Active Problem List    Diagnosis Date Noted     CKD (chronic kidney disease), stage II 12/21/2022     Priority: Medium     Esophagitis, [place] grade D 09/29/2019     Priority: Medium     Benign essential hypertension 09/29/2019     Priority: Medium     Type 2 diabetes mellitus without complication (H) 09/14/2016     Priority: Medium     Cataract 09/14/2016     Priority: Medium      No past medical history on file.  Past Surgical History:   Procedure Laterality Date     NO PAST SURGERIES       Current Outpatient Medications   Medication Sig Dispense Refill     atorvastatin (LIPITOR) 40 MG  tablet Take 1 tablet (40 mg) by mouth daily 90 tablet 1     CONTOUR NEXT TEST test strip USE AS DIRECTED ONCE DAILY // 1 HNUB SIV 1 ZAUG RAWS LI QHIA       insulin glargine (LANTUS VIAL) 100 UNIT/ML vial Inject 34 Units Subcutaneous At Bedtime       losartan (COZAAR) 25 MG tablet Take 1 tablet (25 mg) by mouth daily 30 tablet 0     metFORMIN (GLUCOPHAGE) 500 MG tablet Take 2 tablets (1,000 mg) by mouth 2 times daily (with meals) 120 tablet 1       No Known Allergies     Social History     Tobacco Use     Smoking status: Former     Types: Cigarettes     Passive exposure: Never     Smokeless tobacco: Never     Tobacco comments:     10 cigs per day   Substance Use Topics     Alcohol use: No     History   Drug Use No         Objective     /64 (BP Location: Right arm, Patient Position: Sitting, Cuff Size: Adult Regular)   Pulse 96   Wt 65.8 kg (145 lb)   SpO2 100%   BMI 25.69 kg/m      Physical Exam    Gen: Pleasant. No distress.  Very pleasant.    HEENT: MMM. Wearing sunglasses.   Neck: No overt asymmetry.   CV: Appears well-perfused. RRR. No murmur.   Resp: Breathing comfortably on room air. Lungs clear to auscultation bilaterally without wheeze or crackle.   Abd: Non-distended, non-tender.   Ext: No edema or overt asymmetry/deformity.   Skin: No overt rash on easily visualized skin.   Neuro: Non-focal.   Psych: Calm.         Recent Labs   Lab Test 03/22/23  1014 12/19/22  1543    137   POTASSIUM 4.6 4.6   CR 1.59* 1.55*   A1C 6.5* 7.5*        Diagnostics:  Recent Results (from the past 720 hour(s))   Hemoglobin A1c    Collection Time: 03/22/23 10:14 AM   Result Value Ref Range    Hemoglobin A1C 6.5 (H) 0.0 - 5.6 %   Basic metabolic panel    Collection Time: 03/22/23 10:14 AM   Result Value Ref Range    Sodium 138 136 - 145 mmol/L    Potassium 4.6 3.4 - 5.3 mmol/L    Chloride 104 98 - 107 mmol/L    Carbon Dioxide (CO2) 24 22 - 29 mmol/L    Anion Gap 10 7 - 15 mmol/L    Urea Nitrogen 28.8 (H) 8.0 - 23.0  mg/dL    Creatinine 1.59 (H) 0.67 - 1.17 mg/dL    Calcium 9.4 8.8 - 10.2 mg/dL    Glucose 160 (H) 70 - 99 mg/dL    GFR Estimate 49 (L) >60 mL/min/1.73m2   Albumin Random Urine Quantitative with Creat Ratio    Collection Time: 03/22/23 10:14 AM   Result Value Ref Range    Creatinine Urine mg/dL 238.0 mg/dL    Albumin Urine mg/L 1,194.0 mg/L    Albumin Urine mg/g Cr 501.68 (H) 0.00 - 17.00 mg/g Cr      No EKG required for low risk surgery (cataract, skin procedure, breast biopsy, etc).    Revised Cardiac Risk Index (RCRI):  The patient has the following serious cardiovascular risks for perioperative complications:   - Diabetes Mellitus (on Insulin) = 1 point     RCRI Interpretation: 1 point: Class II (low risk - 0.9% complication rate)           Signed Electronically by: Tree Godinez DO  Copy of this evaluation report is provided to requesting physician.    Precepted with Dr. Edwards

## 2023-03-30 RX ORDER — METFORMIN HYDROCHLORIDE 750 MG/1
TABLET, EXTENDED RELEASE ORAL
COMMUNITY
Start: 2023-03-28 | End: 2024-02-14

## 2023-03-30 RX ORDER — KETOROLAC TROMETHAMINE 5 MG/ML
SOLUTION OPHTHALMIC
COMMUNITY
Start: 2023-03-27 | End: 2024-02-14

## 2023-03-30 NOTE — PROGRESS NOTES
Clinic Care Coordination Contact    Follow Up Progress Note      Assessment: The pt came to see me about his co-pay. Pt stated that every time, he comes in, he has a co-pay of $25. Pt stated that he would like to know why. It seems like the pt has are. I called to [facility] about the pt coverage. They stated that the pt has [employer].     I [facility], they informed me that the pt has that co-pay, is because he applied for medical insurance, he did not have his green card. The pt needs to inform the Critical access hospital, or make a copy of it and send it to [facility]. Once they received it, the pt will not have a co-pay.    Care Gaps:    Health Maintenance Due   Topic Date Due     ADVANCE CARE PLANNING  Never done     EYE EXAM  Never done     COVID-19 Vaccine (1) Never done     URINALYSIS  Never done     Pneumococcal Vaccine: Pediatrics (0 to 5 Years) and At-Risk Patients (6 to 64 Years) (1 - PCV) Never done     COLORECTAL CANCER SCREENING  Never done     DTAP/TDAP/TD IMMUNIZATION (1 - Tdap) Never done     ZOSTER IMMUNIZATION (1 of 2) Never done     LUNG CANCER SCREENING  Never done     YEARLY PREVENTIVE VISIT  09/14/2017     INFLUENZA VACCINE (1) Never done           Care Plans      Intervention/Education provided during outreach:               Plan:     Care Coordinator will follow up in

## 2023-03-31 NOTE — PATIENT INSTRUCTIONS
Pre-op faxed to fax number :  [phone]  Location :    Date of Surgery :  04/05/2023  By/Date :  Nathaly 03/31/2023

## 2023-03-31 NOTE — PROGRESS NOTES
Preceptor Attestation:  Patient's case reviewed and discussed with rTee Godinez MD resident and I evaluated the patient. I agree with written assessment and plan of care.  Supervising Physician:  SHERMAN OLIVIA MD  [facility]

## 2023-06-23 DIAGNOSIS — I10 BENIGN ESSENTIAL HYPERTENSION: ICD-10-CM

## 2023-06-26 RX ORDER — LOSARTAN POTASSIUM 25 MG/1
TABLET ORAL
Qty: 30 TABLET | Refills: 0 | Status: SHIPPED | OUTPATIENT
Start: 2023-06-26 | End: 2023-07-31

## 2023-07-28 RX ORDER — PREDNISOLONE ACETATE 10 MG/ML
SUSPENSION/ DROPS OPHTHALMIC
COMMUNITY
Start: 2023-03-28 | End: 2024-02-14

## 2023-07-31 ENCOUNTER — OFFICE VISIT (OUTPATIENT)
Dept: FAMILY MEDICINE | Facility: CLINIC | Age: 63
End: 2023-07-31
Payer: COMMERCIAL

## 2023-07-31 VITALS
DIASTOLIC BLOOD PRESSURE: 70 MMHG | SYSTOLIC BLOOD PRESSURE: 106 MMHG | OXYGEN SATURATION: 100 % | WEIGHT: 135 LBS | HEART RATE: 82 BPM | BODY MASS INDEX: 23.91 KG/M2

## 2023-07-31 DIAGNOSIS — Z79.4 TYPE 2 DIABETES MELLITUS WITH MICROALBUMINURIA, WITH LONG-TERM CURRENT USE OF INSULIN (H): ICD-10-CM

## 2023-07-31 DIAGNOSIS — E11.29 TYPE 2 DIABETES MELLITUS WITH MICROALBUMINURIA, WITH LONG-TERM CURRENT USE OF INSULIN (H): ICD-10-CM

## 2023-07-31 DIAGNOSIS — R80.9 TYPE 2 DIABETES MELLITUS WITH MICROALBUMINURIA, WITH LONG-TERM CURRENT USE OF INSULIN (H): ICD-10-CM

## 2023-07-31 DIAGNOSIS — I10 BENIGN ESSENTIAL HYPERTENSION: Primary | ICD-10-CM

## 2023-07-31 PROBLEM — K20.80 ESOPHAGITIS, LOS ANGELES GRADE D: Status: RESOLVED | Noted: 2019-09-29 | Resolved: 2023-07-31

## 2023-07-31 LAB
ALBUMIN UR-MCNC: 30 MG/DL
APPEARANCE UR: CLEAR
BILIRUB UR QL STRIP: NEGATIVE
COLOR UR AUTO: YELLOW
GLUCOSE UR STRIP-MCNC: 100 MG/DL
HGB UR QL STRIP: ABNORMAL
KETONES UR STRIP-MCNC: NEGATIVE MG/DL
LEUKOCYTE ESTERASE UR QL STRIP: NEGATIVE
NITRATE UR QL: NEGATIVE
PH UR STRIP: 6 [PH] (ref 5–7)
RBC #/AREA URNS AUTO: ABNORMAL /HPF
SP GR UR STRIP: 1.01 (ref 1–1.03)
UROBILINOGEN UR STRIP-ACNC: 0.2 E.U./DL
WBC #/AREA URNS AUTO: ABNORMAL /HPF

## 2023-07-31 PROCEDURE — 99214 OFFICE O/P EST MOD 30 MIN: CPT | Mod: GC | Performed by: STUDENT IN AN ORGANIZED HEALTH CARE EDUCATION/TRAINING PROGRAM

## 2023-07-31 PROCEDURE — 81001 URINALYSIS AUTO W/SCOPE: CPT | Performed by: STUDENT IN AN ORGANIZED HEALTH CARE EDUCATION/TRAINING PROGRAM

## 2023-07-31 RX ORDER — LOSARTAN POTASSIUM 25 MG/1
50 TABLET ORAL DAILY
Qty: 30 TABLET | Refills: 1 | Status: SHIPPED | OUTPATIENT
Start: 2023-07-31 | End: 2024-02-14

## 2023-07-31 NOTE — PROGRESS NOTES
Assessment & Plan     Benign essential hypertension  Hypertensive today 174/80. Per notes had previously discussed increasing losartan to 50 mg, but had been taking 25 mg thus far. Also was planing to increase due to albuminuria. Will monitor closely for hypotension. Follow up in 2 weeks for BP recheck.  - losartan (COZAAR) 25 MG tablet; Take 2 tablets (50 mg) by mouth daily    Type 2 diabetes mellitus with microalbuminuria, with long-term current use of insulin (H)  Great DM control with A1C<7 and within goal. Patient wants to be off insulin. Continue taking metformin. Will decrease insulin to 28 units. Discussed importance of letting us know if he has any highs or lows. Will start SGLT2 given CKD/albuminuria and DM.  - empagliflozin (JARDIANCE) 10 MG TABS tablet; Take 1 tablet (10 mg) by mouth daily        Return in about 2 weeks (around 8/14/2023) for Follow up BP.    Laura Caldwell MD  [facility]    Subjective   Y is a 63 year old, presenting for the following health issues:  RECHECK (Regular check )      7/31/2023     9:48 AM   Additional Questions   Roomed by Ety   Accompanied by Self       HPI     Genitourinary - Male  Onset/Duration: chronic  Description:   Dysuria (painful urination): No}  Hematuria (blood in urine): No  Frequency: YES  Waking at night to urinate: YES  Hesitancy (delay in urine): No  Retention (unable to empty): YES sometimes  Decrease in urinary flow: No  Incontinence: No  Progression of Symptoms:  same  Accompanying Signs & Symptoms:  Fever: No  Back/Flank pain: No  Urethral discharge: No  Testicle lumps/masses/pain: No  Nausea and/or vomiting: No  Abdominal pain: No  History:   History of frequent UTI s: No  History of kidney stones: No  History of hernias: No  Personal or Family history of Prostate problems: No  Sexually active: No  Precipitating or alleviating factors: None  Therapies tried and outcome: none    Hypertension Follow-up    Do you check your  blood pressure regularly outside of the clinic? Yes   Are you following a low salt diet? Yes  Are your blood pressures ever more than 140 on the top number (systolic) OR more   than 90 on the bottom number (diastolic), for example 140/90? Yes    T2DM  - A1c 6.5.  On metformin, insulin. Screening labs display albuminuria, which was not previously identified.   - Bgs are in 100s-160s. No lows  - Taking 32 units of Insulin and metformin  - Exercising and eating better    Review of Systems   Constitutional, HEENT, cardiovascular, pulmonary, gi and gu systems are negative, except as otherwise noted.      Objective    BP (!) 174/80 (BP Location: Right arm, Patient Position: Sitting, Cuff Size: Adult Regular)   Pulse 95   Wt 61.2 kg (135 lb)   SpO2 100%   BMI 23.91 kg/m    Body mass index is 23.91 kg/m .  Physical Exam   GENERAL: healthy, alert and no distress  RESP: lungs clear to auscultation - no rales, rhonchi or wheezes  CV: regular rate and rhythm, normal S1 S2, no S3 or S4, no murmur, click or rub, no peripheral edema and peripheral pulses strong  MS: no gross musculoskeletal defects noted, no edema

## 2023-07-31 NOTE — PROGRESS NOTES
Preceptor Attestation:  Patient's case reviewed and discussed with the resident, Laura Caldwell MD, and I personally evaluated the patient. I agree with written assessment and plan of care.    Supervising Physician:  Danii Oakley MD   [facility]

## 2023-07-31 NOTE — PROGRESS NOTES
{PROVIDER CHARTING PREFERENCE:265284}    Subjective   Y is a 63 year old, presenting for the following health issues:  RECHECK (Regular check )      7/31/2023     9:54 AM   Additional Questions   Roomed by Ety   Accompanied by Self       HPI     {SUPERLIST (Optional):772695}  {additonal problems for provider to add (Optional):440917}      Review of Systems   {ROS COMP (Optional):560678}      Objective    BP (!) 174/80 (BP Location: Right arm, Patient Position: Sitting, Cuff Size: Adult Regular)   Pulse 95   Wt 61.2 kg (135 lb)   SpO2 100%   BMI 23.91 kg/m    Body mass index is 23.91 kg/m .  Physical Exam   {Exam List (Optional):371519}    {Diagnostic Test Results (Optional):053903}    {AMBULATORY ATTESTATION (Optional):552308}

## 2024-02-06 ENCOUNTER — APPOINTMENT (OUTPATIENT)
Dept: INTERPRETER SERVICES | Facility: CLINIC | Age: 64
End: 2024-02-06
Payer: COMMERCIAL

## 2024-02-13 NOTE — PROGRESS NOTES
Assessment & Plan     Type 2 diabetes mellitus with microalbuminuria, with long-term current use of insulin (H)  A1c increased from 6.5% in March 2023 to now 8.1%.  Complications include microalbuminuria, neuropathy.    - Lipid Profile  - continue metformin, renewed 500 mg XR daily (no increase due to renal function)  - start empagliflozin (JARDIANCE) 10 MG TABS tablet; Take 1 tablet (10 mg) by mouth daily  - CONTOUR NEXT TEST test strip; USE AS DIRECTED ONCE DAILY // 1 HNUB SIV 1 ZAUG BRAD WITT QHIA  - minimal decrease of insulin due to initiation of SGLT2: LANTUS SOLOSTAR 100 UNIT/ML soln; Inject 26 Units Subcutaneous at bedtime  - insulin pen needle (32G X 4 MM) 32G X 4 MM miscellaneous; Use 1 pen needles daily or as directed.    Benign essential hypertension  Hyperkalemia  BP well above goal of <140/90.  Also has hyperkalemia on BMP -?ARB vs CKD vs hemolyzed   - Start chlorthalidone (HYGROTON) 25 MG tablet; Take 0.5 tablets (12.5 mg) by mouth daily  - continue ARB as he has no prior hx of hyperK on ARB in past, but will not increase dose:  losartan (COZAAR) 25 MG tablet; Take 1 tablet (25 mg) by mouth daily  - recheck BP and BMP in 1 week    Stage 3b chronic kidney disease (H)  Creatinine increased from 1.59 to 2.00, now GFR 37  Suspect related to DM and HTN, but will check renal US  - US Renal Complete Non-Vascular; Future  - Albumin Random Urine Quantitative with Creat Ratio; Future    HCM -   Vaccines - covid, RSV, flu, zoster, Tdap - declines all  Crc screen - discuss next visit  LDCT - not needed, has 10 pack year history  Transportation needs - recommended care coordination, he plans to talk to Souk himself    Review of the result(s) of each unique test - A1c, BMP  Prescription drug management  65 minutes spent by me on the date of the encounter doing chart review, review of test results, interpretation of tests, patient visit, and documentation       Return in about 1 week (around  "2/21/2024).        Subjective   Y is a 63 year old with diabetes, HTN, CKD, , presenting for the following health issues:  Diabetes and Recheck Medication        2/14/2024     9:12 AM   Additional Questions   Roomed by Aurora   Accompanied by self       Due to language barrier, a [employer] [facility]  was present during the history-taking, physical exam, and subsequent discussion with this patient.     Patient says he is here today because he would like health to get better  Needs refills on medications and requests 3 mo supply due to his difficulty with transportation.     DM -   Hx of +Microalbuminuria, +neuropathy  Hx of diabetic foot ulcer, bilateral, 2022.  Required IV abx.    Hx of DKA  Last seen in July, reduced insulin and started empagliflozin. On metformin.   Currently taking insulin 27-28 units daily.  Metformin 1 pill per day (not sure if 500 or 750mg)  Not taking jardiance or atorvastatin  Walking every day  BG - at 6 pm every day, before eating.  <200  No hypoglycemia.     HTN -   Losartan 25 mg 1 pill per day (per chart, was supposed to take 50 mg/d).  No side effects  Does not have BP cuff  No chest pain, no HA, neuro sx.    CKD -   Normal urine, no blood or pain.  Has nocturia x 2      PMH   reviewed    PSH:   s/p cataract surgery 2023    SH  Moved to [place] in 2016 from [place]   Not able to drive  Lives with girlfriend, son.  Former smoker = 10 pack years         Objective    BP (!) 167/88 (BP Location: Right arm, Patient Position: Sitting, Cuff Size: Adult Regular)   Pulse 89   Temp 98  F (36.7  C) (Oral)   Resp 16   Ht 1.629 m (5' 4.13\")   Wt 64 kg (141 lb)   SpO2 100%   BMI 24.10 kg/m    Body mass index is 24.1 kg/m .     GENERAL: alert and no distress  NECK: no adenopathy, no asymmetry, masses, or scars  RESP: lungs clear to auscultation - no rales, rhonchi or wheezes  CV: regular rate and rhythm, normal S1 S2, no S3 or S4, no murmur, click or rub, no peripheral edema, no carotid " bruits.  MS: no gross musculoskeletal defects noted, no edema  Diabetic foot exam: normal DP and PT pulses, reduced sensation of entire right foot, decreased throughout left foot, dry cracking heels and callouses near great toes bilaterally, and nail exam onychomycosis of the toenails    Results for orders placed or performed in visit on 02/14/24 (from the past 24 hour(s))   Hemoglobin A1c   Result Value Ref Range    Hemoglobin A1C 8.1 (H) 0.0 - 5.6 %   Basic metabolic panel   Result Value Ref Range    Sodium 137 135 - 145 mmol/L    Potassium 5.7 (H) 3.4 - 5.3 mmol/L    Chloride 108 94 - 109 mmol/L    Carbon Dioxide (CO2) 25 20 - 32 mmol/L    Anion Gap 4 3 - 14 mmol/L    Urea Nitrogen 36 (H) 7 - 30 mg/dL    Creatinine 2.00 (H) 0.66 - 1.25 mg/dL    GFR Estimate 37 (L) >60 mL/min/1.73m2    Calcium 9.6 8.5 - 10.1 mg/dL    Glucose 125 (H) 70 - 99 mg/dL   Urine Macroscopic with reflex to Microscopic   Result Value Ref Range    Color Urine Yellow Colorless, Straw, Light Yellow, Yellow    Appearance Urine Clear Clear    Glucose Urine 100 (A) Negative mg/dL    Bilirubin Urine Negative Negative    Ketones Urine Negative Negative mg/dL    Specific Gravity Urine 1.020 1.003 - 1.035    Blood Urine Small (A) Negative    pH Urine 5.5 5.0 - 7.0    Protein Albumin Urine 100 (A) Negative mg/dL    Urobilinogen Urine 0.2 0.2, 1.0 E.U./dL    Nitrite Urine Negative Negative    Leukocyte Esterase Urine Negative Negative   Urine Microscopic Exam   Result Value Ref Range    Bacteria Urine Few (A) None Seen /HPF    RBC Urine 0-2 0-2 /HPF /HPF    WBC Urine 0-5 0-5 /HPF /HPF    Squamous Epithelials Urine None Seen None Seen /LPF           Signed Electronically by: Debbie Spangler MD

## 2024-02-14 ENCOUNTER — OFFICE VISIT (OUTPATIENT)
Dept: FAMILY MEDICINE | Facility: CLINIC | Age: 64
End: 2024-02-14
Payer: COMMERCIAL

## 2024-02-14 VITALS
RESPIRATION RATE: 16 BRPM | BODY MASS INDEX: 24.07 KG/M2 | HEIGHT: 64 IN | OXYGEN SATURATION: 100 % | HEART RATE: 89 BPM | TEMPERATURE: 98 F | DIASTOLIC BLOOD PRESSURE: 88 MMHG | SYSTOLIC BLOOD PRESSURE: 167 MMHG | WEIGHT: 141 LBS

## 2024-02-14 DIAGNOSIS — N18.32 STAGE 3B CHRONIC KIDNEY DISEASE (H): ICD-10-CM

## 2024-02-14 DIAGNOSIS — E11.29 TYPE 2 DIABETES MELLITUS WITH MICROALBUMINURIA, WITH LONG-TERM CURRENT USE OF INSULIN (H): Primary | ICD-10-CM

## 2024-02-14 DIAGNOSIS — I10 BENIGN ESSENTIAL HYPERTENSION: ICD-10-CM

## 2024-02-14 DIAGNOSIS — R80.9 TYPE 2 DIABETES MELLITUS WITH MICROALBUMINURIA, WITH LONG-TERM CURRENT USE OF INSULIN (H): Primary | ICD-10-CM

## 2024-02-14 DIAGNOSIS — Z79.4 TYPE 2 DIABETES MELLITUS WITH MICROALBUMINURIA, WITH LONG-TERM CURRENT USE OF INSULIN (H): Primary | ICD-10-CM

## 2024-02-14 LAB
ALBUMIN UR-MCNC: 100 MG/DL
ANION GAP SERPL CALCULATED.3IONS-SCNC: 4 MMOL/L (ref 3–14)
APPEARANCE UR: CLEAR
BACTERIA #/AREA URNS HPF: ABNORMAL /HPF
BILIRUB UR QL STRIP: NEGATIVE
BUN SERPL-MCNC: 36 MG/DL (ref 7–30)
CALCIUM SERPL-MCNC: 9.6 MG/DL (ref 8.5–10.1)
CHLORIDE BLD-SCNC: 108 MMOL/L (ref 94–109)
CHOLEST SERPL-MCNC: 176 MG/DL
CO2 SERPL-SCNC: 25 MMOL/L (ref 20–32)
COLOR UR AUTO: YELLOW
CREAT SERPL-MCNC: 2 MG/DL (ref 0.66–1.25)
CREAT UR-MCNC: 89.1 MG/DL
EGFRCR SERPLBLD CKD-EPI 2021: 37 ML/MIN/1.73M2
FASTING STATUS PATIENT QL REPORTED: ABNORMAL
GLUCOSE BLD-MCNC: 125 MG/DL (ref 70–99)
GLUCOSE UR STRIP-MCNC: 100 MG/DL
HBA1C MFR BLD: 8.1 % (ref 0–5.6)
HDLC SERPL-MCNC: 46 MG/DL
HGB UR QL STRIP: ABNORMAL
KETONES UR STRIP-MCNC: NEGATIVE MG/DL
LDLC SERPL CALC-MCNC: 114 MG/DL
LEUKOCYTE ESTERASE UR QL STRIP: NEGATIVE
MICROALBUMIN UR-MCNC: 308 MG/L
MICROALBUMIN/CREAT UR: 345.68 MG/G CR (ref 0–17)
NITRATE UR QL: NEGATIVE
NONHDLC SERPL-MCNC: 130 MG/DL
PH UR STRIP: 5.5 [PH] (ref 5–7)
POTASSIUM BLD-SCNC: 5.7 MMOL/L (ref 3.4–5.3)
RBC #/AREA URNS AUTO: ABNORMAL /HPF
SODIUM SERPL-SCNC: 137 MMOL/L (ref 135–145)
SP GR UR STRIP: 1.02 (ref 1–1.03)
SQUAMOUS #/AREA URNS AUTO: ABNORMAL /LPF
TRIGL SERPL-MCNC: 79 MG/DL
UROBILINOGEN UR STRIP-ACNC: 0.2 E.U./DL
WBC #/AREA URNS AUTO: ABNORMAL /HPF

## 2024-02-14 PROCEDURE — 81001 URINALYSIS AUTO W/SCOPE: CPT | Performed by: FAMILY MEDICINE

## 2024-02-14 PROCEDURE — 82570 ASSAY OF URINE CREATININE: CPT | Performed by: FAMILY MEDICINE

## 2024-02-14 PROCEDURE — 36415 COLL VENOUS BLD VENIPUNCTURE: CPT | Performed by: FAMILY MEDICINE

## 2024-02-14 PROCEDURE — 82043 UR ALBUMIN QUANTITATIVE: CPT | Performed by: FAMILY MEDICINE

## 2024-02-14 PROCEDURE — 80061 LIPID PANEL: CPT | Performed by: FAMILY MEDICINE

## 2024-02-14 PROCEDURE — 83036 HEMOGLOBIN GLYCOSYLATED A1C: CPT | Performed by: FAMILY MEDICINE

## 2024-02-14 PROCEDURE — 80048 BASIC METABOLIC PNL TOTAL CA: CPT | Performed by: FAMILY MEDICINE

## 2024-02-14 PROCEDURE — 99214 OFFICE O/P EST MOD 30 MIN: CPT | Performed by: FAMILY MEDICINE

## 2024-02-14 RX ORDER — ATORVASTATIN CALCIUM 40 MG/1
40 TABLET, FILM COATED ORAL DAILY
Qty: 90 TABLET | Refills: 1 | Status: SHIPPED | OUTPATIENT
Start: 2024-02-14 | End: 2024-03-06

## 2024-02-14 RX ORDER — INSULIN GLARGINE 100 [IU]/ML
26 INJECTION, SOLUTION SUBCUTANEOUS AT BEDTIME
COMMUNITY
Start: 2023-10-18 | End: 2024-02-14

## 2024-02-14 RX ORDER — CHLORTHALIDONE 25 MG/1
12.5 TABLET ORAL DAILY
Qty: 45 TABLET | Refills: 0 | Status: SHIPPED | OUTPATIENT
Start: 2024-02-14 | End: 2024-04-10

## 2024-02-14 RX ORDER — LOSARTAN POTASSIUM 25 MG/1
25 TABLET ORAL DAILY
Qty: 90 TABLET | Refills: 1 | Status: SHIPPED | OUTPATIENT
Start: 2024-02-14 | End: 2024-03-06

## 2024-02-14 RX ORDER — INSULIN GLARGINE 100 [IU]/ML
26 INJECTION, SOLUTION SUBCUTANEOUS AT BEDTIME
Qty: 30 ML | Refills: 1 | Status: SHIPPED | OUTPATIENT
Start: 2024-02-14 | End: 2024-06-12

## 2024-02-21 ENCOUNTER — OFFICE VISIT (OUTPATIENT)
Dept: FAMILY MEDICINE | Facility: CLINIC | Age: 64
End: 2024-02-21
Payer: COMMERCIAL

## 2024-02-21 VITALS
DIASTOLIC BLOOD PRESSURE: 87 MMHG | BODY MASS INDEX: 23.9 KG/M2 | OXYGEN SATURATION: 99 % | HEIGHT: 64 IN | SYSTOLIC BLOOD PRESSURE: 166 MMHG | TEMPERATURE: 97.9 F | WEIGHT: 140 LBS | HEART RATE: 88 BPM

## 2024-02-21 DIAGNOSIS — E11.29 TYPE 2 DIABETES MELLITUS WITH MICROALBUMINURIA, WITH LONG-TERM CURRENT USE OF INSULIN (H): ICD-10-CM

## 2024-02-21 DIAGNOSIS — Z79.4 TYPE 2 DIABETES MELLITUS WITH MICROALBUMINURIA, WITH LONG-TERM CURRENT USE OF INSULIN (H): ICD-10-CM

## 2024-02-21 DIAGNOSIS — N18.32 STAGE 3B CHRONIC KIDNEY DISEASE (H): ICD-10-CM

## 2024-02-21 DIAGNOSIS — E87.5 HYPERKALEMIA: Primary | ICD-10-CM

## 2024-02-21 DIAGNOSIS — R80.9 TYPE 2 DIABETES MELLITUS WITH MICROALBUMINURIA, WITH LONG-TERM CURRENT USE OF INSULIN (H): ICD-10-CM

## 2024-02-21 DIAGNOSIS — I10 BENIGN ESSENTIAL HYPERTENSION: ICD-10-CM

## 2024-02-21 LAB
ANION GAP SERPL CALCULATED.3IONS-SCNC: 7 MMOL/L (ref 3–14)
BUN SERPL-MCNC: 42 MG/DL (ref 7–30)
CALCIUM SERPL-MCNC: 9.4 MG/DL (ref 8.5–10.1)
CHLORIDE BLD-SCNC: 107 MMOL/L (ref 94–109)
CO2 SERPL-SCNC: 22 MMOL/L (ref 20–32)
CREAT SERPL-MCNC: 2 MG/DL (ref 0.66–1.25)
EGFRCR SERPLBLD CKD-EPI 2021: 37 ML/MIN/1.73M2
GLUCOSE BLD-MCNC: 139 MG/DL (ref 70–99)
POTASSIUM BLD-SCNC: 5.2 MMOL/L (ref 3.4–5.3)
SODIUM SERPL-SCNC: 136 MMOL/L (ref 135–145)

## 2024-02-21 PROCEDURE — 80048 BASIC METABOLIC PNL TOTAL CA: CPT | Performed by: FAMILY MEDICINE

## 2024-02-21 PROCEDURE — 99214 OFFICE O/P EST MOD 30 MIN: CPT | Performed by: FAMILY MEDICINE

## 2024-02-21 PROCEDURE — 36415 COLL VENOUS BLD VENIPUNCTURE: CPT | Performed by: FAMILY MEDICINE

## 2024-02-21 NOTE — PATIENT INSTRUCTIONS
Check at pharmacy to see if they have losartan available for you to .     Continue to take all of your medications as listed on this visit summary.

## 2024-02-21 NOTE — PROGRESS NOTES
Assessment & Plan     Hyperkalemia  K improved from 5.7 to 5.2, possibly due to addition of chlorthalidone.  - no change in medications today, monitor closely.    Stage 3b chronic kidney disease (H)  GFR remains 37, CKD labs as below.  Recently started empagliflozin.  Resume losartan (not currently taking) and recheck K+ and cr.   Referral to renal to follow along as well.    - Parathyroid Hormone Intact; Future  - Phosphorus; Future  - Hemoglobin; Future  - Alkaline phosphatase; Future  - US Renal Complete Non-Vascular; Future  - Adult Nephrology  Referral; Future    Benign essential hypertension  Not at goal of <140/90, but not taking losartan  - continue chlorthalidone 12.5 mg daily, may need to increase  - resume losartan 25 mg daily    Type 2 diabetes mellitus with microalbuminuria, with long-term current use of insulin (H)  Recent A1c last week was 8.1%.  Complications include microalbuminuria, neuropathy. Hx of diabetic foot ulcer, bilateral, 2022.  Hx of DKA.  - continue fingerstick testing but will review option of CGM at f/up  - continue lantus 28 unit(s) daily   - continue metformin  mg daily (not increasing due to renal fxn)  - start empagliflozin 10 mg daily, reviewed risks/benefits/side effects of medication    HCM -   Vaccines - covid, RSV, flu, zoster, Tdap - declines all  Crc screen - discuss next visit    Return in about 2 weeks (around 3/6/2024) for Follow up, with me.        Subjective   Y is a 63 year old, presenting for the following health issues:  Follow-up of kidneys, diabetes and medications        2/21/2024    10:30 AM   Additional Questions   Roomed by Aurora   Accompanied by self         2/21/2024    Information    services provided? Yes   Language Hmong   Type of interpretation provided Face-to-face    name Connie Lane    [employer]       DM - Started empagliflozin, also taking metformin and lantus  Has many questions  "about these medications.  Particularly concerned about long term affects of empagliflozin.  Also wonders if he will be able to stop insulin.     HTN - Started chlorthalidone and took dose yesterday.   Does not have losartan at home.    Taking atorvastatin.    CKD - Ordered renal US, not yet scheduled.         Objective    BP (!) 166/87   Pulse 88   Temp 97.9  F (36.6  C) (Oral)   Ht 1.62 m (5' 3.78\")   Wt 63.5 kg (140 lb)   SpO2 99%   BMI 24.20 kg/m    Body mass index is 24.2 kg/m .    GENERAL: alert and no distress  EYES: Eyes grossly normal to inspection.  No discharge or erythema, or obvious scleral/conjunctival abnormalities.  RESP: No audible wheeze, cough, or visible cyanosis.    SKIN: Visible skin clear. No significant rash, abnormal pigmentation or lesions.  NEURO: Cranial nerves grossly intact.  Mentation and speech appropriate for age.  PSYCH: Appropriate affect, tone, and pace of words      Results for orders placed or performed in visit on 02/21/24   Basic metabolic panel     Status: Abnormal   Result Value Ref Range    Sodium 136 135 - 145 mmol/L    Potassium 5.2 3.4 - 5.3 mmol/L    Chloride 107 94 - 109 mmol/L    Carbon Dioxide (CO2) 22 20 - 32 mmol/L    Anion Gap 7 3 - 14 mmol/L    Urea Nitrogen 42 (H) 7 - 30 mg/dL    Creatinine 2.00 (H) 0.66 - 1.25 mg/dL    GFR Estimate 37 (L) >60 mL/min/1.73m2    Calcium 9.4 8.5 - 10.1 mg/dL    Glucose 139 (H) 70 - 99 mg/dL           Signed Electronically by: Debbie Spangler MD    "

## 2024-03-05 NOTE — PROGRESS NOTES
"  Assessment & Plan     Stage 3b chronic kidney disease (H)  GFR has slowly been trending down.  +proteinuria.  Has underlying HTN and DM2 on insulin.  Recommended nephrology consult and renal US but patient declines these referrals, may consider them again in the future.    - Parathyroid Hormone Intact  - Phosphorus  - Hemoglobin  - Alkaline phosphatase  - Basic metabolic panel    Benign essential hypertension  Still not at goal of <140/90, but has been alternating losartan 25 and 50 mg doses but taking regularly. Also taking chlorthalidone 12.5 mg daily.   - increase losartan to 50 mg daily  - pending labs, consider increase of chlorthalidone.     Type 2 diabetes mellitus with microalbuminuria, with long-term current use of insulin (H)  Above goal with A1c of 8.1%  - continue lantus 26 unit(s) and metformin, empagliflozin just recently started  - resume atorvastatin (LIPITOR) 40 MG tablet; Take 1 tablet (40 mg) by mouth daily    Screen for colon cancer  Declines CRC screening    RTC 1 mo        Subjective   Y is a 63 year old, presenting for the following health issues:  Follow Up        3/6/2024     9:58 AM   Additional Questions   Roomed by Aurora   Accompanied by self         3/6/2024    Information    services provided? Yes   Language Hmong   Type of interpretation provided Face-to-face    name Connie Lane    [employer]       Doing well today. Brings meds to visit to review.     CKD, HTN -   Has losartan 25 mg and 50 mg doses.  Has been alternating them.    Taking empagliflozin regularly.  Did hear from kidney specialist, did not schedule yet.  Doesn't think he needs to see them yet.   Not checking BP outside of clinic    DM -   Taking empagliflozin  Does not have atorvastatin  Has metformin and insulin as well.        Objective    BP (!) 169/87   Pulse 88   Temp 97.5  F (36.4  C) (Oral)   Ht 1.626 m (5' 4\")   Wt 63 kg (139 lb)   SpO2 100%   BMI 23.86 " kg/m    Body mass index is 23.86 kg/m .     GENERAL: alert and no distress  EYES: Eyes grossly normal to inspection.  No discharge or erythema, or obvious scleral/conjunctival abnormalities.  RESP: No audible wheeze, cough, or visible cyanosis.    SKIN: Visible skin clear. No significant rash, abnormal pigmentation or lesions.  NEURO: Cranial nerves grossly intact.  Mentation and speech appropriate for age.  PSYCH: Appropriate affect, tone, and pace of words      Results for orders placed or performed in visit on 03/06/24 (from the past 24 hour(s))   Hemoglobin   Result Value Ref Range    Hemoglobin 10.4 (L) 13.3 - 17.7 g/dL           Signed Electronically by: Debbie Spangler MD

## 2024-03-06 ENCOUNTER — OFFICE VISIT (OUTPATIENT)
Dept: FAMILY MEDICINE | Facility: CLINIC | Age: 64
End: 2024-03-06
Payer: COMMERCIAL

## 2024-03-06 VITALS
TEMPERATURE: 97.5 F | BODY MASS INDEX: 23.73 KG/M2 | DIASTOLIC BLOOD PRESSURE: 82 MMHG | HEART RATE: 88 BPM | HEIGHT: 64 IN | WEIGHT: 139 LBS | OXYGEN SATURATION: 100 % | SYSTOLIC BLOOD PRESSURE: 167 MMHG

## 2024-03-06 DIAGNOSIS — N18.32 STAGE 3B CHRONIC KIDNEY DISEASE (H): Primary | ICD-10-CM

## 2024-03-06 DIAGNOSIS — E11.29 TYPE 2 DIABETES MELLITUS WITH MICROALBUMINURIA, WITH LONG-TERM CURRENT USE OF INSULIN (H): ICD-10-CM

## 2024-03-06 DIAGNOSIS — Z12.11 SCREEN FOR COLON CANCER: ICD-10-CM

## 2024-03-06 DIAGNOSIS — R80.9 TYPE 2 DIABETES MELLITUS WITH MICROALBUMINURIA, WITH LONG-TERM CURRENT USE OF INSULIN (H): ICD-10-CM

## 2024-03-06 DIAGNOSIS — Z79.4 TYPE 2 DIABETES MELLITUS WITH MICROALBUMINURIA, WITH LONG-TERM CURRENT USE OF INSULIN (H): ICD-10-CM

## 2024-03-06 DIAGNOSIS — I10 BENIGN ESSENTIAL HYPERTENSION: ICD-10-CM

## 2024-03-06 LAB — HGB BLD-MCNC: 10.4 G/DL (ref 13.3–17.7)

## 2024-03-06 PROCEDURE — 84100 ASSAY OF PHOSPHORUS: CPT | Performed by: FAMILY MEDICINE

## 2024-03-06 PROCEDURE — 80048 BASIC METABOLIC PNL TOTAL CA: CPT | Performed by: FAMILY MEDICINE

## 2024-03-06 PROCEDURE — 99214 OFFICE O/P EST MOD 30 MIN: CPT | Performed by: FAMILY MEDICINE

## 2024-03-06 PROCEDURE — 83970 ASSAY OF PARATHORMONE: CPT | Performed by: FAMILY MEDICINE

## 2024-03-06 PROCEDURE — 36415 COLL VENOUS BLD VENIPUNCTURE: CPT | Performed by: FAMILY MEDICINE

## 2024-03-06 PROCEDURE — 84075 ASSAY ALKALINE PHOSPHATASE: CPT | Performed by: FAMILY MEDICINE

## 2024-03-06 PROCEDURE — 85018 HEMOGLOBIN: CPT | Performed by: FAMILY MEDICINE

## 2024-03-06 RX ORDER — LOSARTAN POTASSIUM 50 MG/1
50 TABLET ORAL DAILY
Qty: 30 TABLET | Refills: 5 | Status: SHIPPED | OUTPATIENT
Start: 2024-03-06 | End: 2024-03-13

## 2024-03-06 RX ORDER — ATORVASTATIN CALCIUM 40 MG/1
40 TABLET, FILM COATED ORAL DAILY
Qty: 90 TABLET | Refills: 1 | Status: SHIPPED | OUTPATIENT
Start: 2024-03-06 | End: 2024-04-24

## 2024-03-07 ENCOUNTER — APPOINTMENT (OUTPATIENT)
Dept: INTERPRETER SERVICES | Facility: CLINIC | Age: 64
End: 2024-03-07
Payer: COMMERCIAL

## 2024-03-07 LAB
ALP SERPL-CCNC: 94 U/L (ref 40–150)
ANION GAP SERPL CALCULATED.3IONS-SCNC: 10 MMOL/L (ref 7–15)
BUN SERPL-MCNC: 64 MG/DL (ref 8–23)
CALCIUM SERPL-MCNC: 9 MG/DL (ref 8.8–10.2)
CHLORIDE SERPL-SCNC: 105 MMOL/L (ref 98–107)
CREAT SERPL-MCNC: 2.04 MG/DL (ref 0.67–1.17)
DEPRECATED HCO3 PLAS-SCNC: 17 MMOL/L (ref 22–29)
EGFRCR SERPLBLD CKD-EPI 2021: 36 ML/MIN/1.73M2
GLUCOSE SERPL-MCNC: 159 MG/DL (ref 70–99)
PHOSPHATE SERPL-MCNC: 3.2 MG/DL (ref 2.5–4.5)
POTASSIUM SERPL-SCNC: 5.7 MMOL/L (ref 3.4–5.3)
PTH-INTACT SERPL-MCNC: 38 PG/ML (ref 15–65)
SODIUM SERPL-SCNC: 132 MMOL/L (ref 135–145)

## 2024-03-08 NOTE — RESULT ENCOUNTER NOTE
Writer called patient, informed Chace of the medication changes: STOP smaller tablet- Losartan 25mg and take Losartan 50mg daily along with a whole tablet of the yellow, Chlorthalidone once a day. Patient inquired about whether to take Chlorthalidone with or without food. Writer verifies with patient how he has been taking medication, takes with food. Per review of medication, Chlorthalidone to be taken with food, pt informed. Writer was also able to schedule follow up appointment with patient. Unable to come in on 3/12/24 as per Dr Spangler's request, no transportation, kids are at school. Child who can drive patient is off on Wednesdays. Scheduled patient for follow up on 3/13/2024. Ian SILVESTRE

## 2024-03-13 ENCOUNTER — OFFICE VISIT (OUTPATIENT)
Dept: FAMILY MEDICINE | Facility: CLINIC | Age: 64
End: 2024-03-13
Payer: COMMERCIAL

## 2024-03-13 VITALS
BODY MASS INDEX: 22.82 KG/M2 | SYSTOLIC BLOOD PRESSURE: 115 MMHG | HEIGHT: 65 IN | DIASTOLIC BLOOD PRESSURE: 67 MMHG | TEMPERATURE: 98 F | HEART RATE: 86 BPM | OXYGEN SATURATION: 100 % | RESPIRATION RATE: 20 BRPM | WEIGHT: 137 LBS

## 2024-03-13 DIAGNOSIS — E87.5 HYPERKALEMIA: Primary | ICD-10-CM

## 2024-03-13 DIAGNOSIS — I10 BENIGN ESSENTIAL HYPERTENSION: ICD-10-CM

## 2024-03-13 DIAGNOSIS — N18.32 STAGE 3B CHRONIC KIDNEY DISEASE (H): ICD-10-CM

## 2024-03-13 LAB
ANION GAP SERPL CALCULATED.3IONS-SCNC: 9 MMOL/L (ref 3–14)
BUN SERPL-MCNC: 73 MG/DL (ref 7–30)
CALCIUM SERPL-MCNC: 9.8 MG/DL (ref 8.5–10.1)
CHLORIDE BLD-SCNC: 111 MMOL/L (ref 94–109)
CO2 SERPL-SCNC: 19 MMOL/L (ref 20–32)
CREAT SERPL-MCNC: 2.5 MG/DL (ref 0.66–1.25)
EGFRCR SERPLBLD CKD-EPI 2021: 29 ML/MIN/1.73M2
GLUCOSE BLD-MCNC: 130 MG/DL (ref 70–99)
POTASSIUM BLD-SCNC: 5.4 MMOL/L (ref 3.4–5.3)
SODIUM SERPL-SCNC: 139 MMOL/L (ref 135–145)

## 2024-03-13 PROCEDURE — 36415 COLL VENOUS BLD VENIPUNCTURE: CPT | Performed by: FAMILY MEDICINE

## 2024-03-13 PROCEDURE — 80048 BASIC METABOLIC PNL TOTAL CA: CPT | Performed by: FAMILY MEDICINE

## 2024-03-13 PROCEDURE — 99214 OFFICE O/P EST MOD 30 MIN: CPT | Performed by: FAMILY MEDICINE

## 2024-03-13 RX ORDER — LOSARTAN POTASSIUM 25 MG/1
25 TABLET ORAL DAILY
Qty: 30 TABLET | Refills: 3 | Status: SHIPPED | OUTPATIENT
Start: 2024-03-13 | End: 2024-04-24

## 2024-03-13 NOTE — PROGRESS NOTES
Assessment & Plan     Hyperkalemia  Suspect this is due to his renal function and medications  - reduce losartan from 50 to 25 mg daily  - low K diet    Benign essential hypertension  BP much improved today   - continue chlorthalidone 25 mg daily  - reduced losartan (COZAAR) 25 MG tablet; Take 1 tablet (25 mg) by mouth daily    Stage 3b chronic kidney disease (H)  Worsening of renal function, GFR 29  (was previously mid-30s).  With proteinuria.   - reduce losartan to 25 mg daily  - continue other meds, but may need to discontinue metformin  - again encouraged Y to see nephrology and schedule US.  He is not quite ready to do this yet.   - avoid NSAIDS and PPI (not taking currently)  - continue to manage underlying HTN and DM  - PTH normal, phos normal, hgb low    Anemia  GOLDY vs CKD vs ACD vs GI loss  - check iron studies at follow-up  - has declined colon cancer screening    DM2  Last A1c 8.1% one month ago  - Continue metformin 500 mg twice daily, but may need to discontinue if GFR worsens any further  - continue lantus insulin, 26 units daily  - continue atorvastatin (recently started in Feb)  - continue empagliflozin 10 mg daily (recently started in Feb)    Return in about 4 weeks (around 4/10/2024).  Traveling to [place] soon for death in family.  Will f/up in 1 mo    Debbie Spangler MD      Subjective   Y is a 63 year old, presenting for the following health issues:  RECHECK        3/13/2024    10:09 AM   Additional Questions   Roomed by Yoly         3/13/2024    Information    services provided? No   Language Hmong   Type of interpretation provided Face-to-face    name Nancy    [employer]     Patient is here today to follow-up on his hyperkalemia and renal function.  At his last appointment, patient's potassium had increased to 5.7 and changes were made in his medications.  Chlorthalidone was increased from 12.5 to 25 mg daily and losartan was increased  "from 25 to 50 mg daily.  Recommended a low potassium diet.    Today, patient is feeling well and has no specific concerns.  He has been taking medications as instructed.  He has asking again if there are any long-term complications from the medications.        Objective    /67   Pulse 86   Temp 98  F (36.7  C)   Resp 20   Ht 1.651 m (5' 5\")   Wt 62.1 kg (137 lb)   SpO2 100%   BMI 22.80 kg/m    Body mass index is 22.8 kg/m .    GENERAL: alert and no distress  EYES: Eyes grossly normal to inspection.  No discharge or erythema, or obvious scleral/conjunctival abnormalities.  RESP: No audible wheeze, cough, or visible cyanosis.    SKIN: Visible skin clear. No significant rash, abnormal pigmentation or lesions.  NEURO: Cranial nerves grossly intact.  Mentation and speech appropriate for age.  PSYCH: Appropriate affect, tone, and pace of words      Results for orders placed or performed in visit on 03/13/24 (from the past 24 hour(s))   Basic metabolic panel   Result Value Ref Range    Sodium 139 135 - 145 mmol/L    Potassium 5.4 (H) 3.4 - 5.3 mmol/L    Chloride 111 (H) 94 - 109 mmol/L    Carbon Dioxide (CO2) 19 (L) 20 - 32 mmol/L    Anion Gap 9 3 - 14 mmol/L    Urea Nitrogen 73 (H) 7 - 30 mg/dL    Creatinine 2.50 (H) 0.66 - 1.25 mg/dL    GFR Estimate 29 (L) >60 mL/min/1.73m2    Calcium 9.8 8.5 - 10.1 mg/dL    Glucose 130 (H) 70 - 99 mg/dL           Signed Electronically by: Debbie Spangler MD    "

## 2024-04-09 DIAGNOSIS — I10 BENIGN ESSENTIAL HYPERTENSION: ICD-10-CM

## 2024-04-09 RX ORDER — CHLORTHALIDONE 25 MG/1
12.5 TABLET ORAL DAILY
Qty: 45 TABLET | Status: CANCELLED | OUTPATIENT
Start: 2024-04-09

## 2024-04-09 NOTE — PROGRESS NOTES
Assessment & Plan     Hyperkalemia  K is elevated again at 5.7  Discussed options for treatment including patiromir vs stop losartan vs dietary changes  - pt would like to adjust diet  - if still not improved, then will start patiromir 8.4 g daily  - hesitant to stop losartan due to his renal disease    Benign essential hypertension  BP slightly increased in office today but well controlled at home  - continue losartan 25 mg daliy  - chlorthalidone (HYGROTON) 25 MG tablet; Take 1 tablet (25 mg) by mouth daily    Stage 3b chronic kidney disease (H)  Cr is 2.00, improved from last visit.    Anemia noted on recent labs.  Suspect CKD related.  Continue empagliflozin, ARB but monitor K, BP and DM control  - CBC with platelets  - Ferritin  - Iron & Iron Binding Capacity    Type 2 diabetes mellitus with microalbuminuria, with long-term current use of insulin (H)  - continue empagliflozin 10 daily - Encouraged patient to take this daily  - continue metformin 500 mg daily, renal function stable  - continue lantus 26 units daily  - check fasting blood glucose daily    Return in about 2 weeks (around 4/24/2024).    Debbie Spangler MD      Subjective   Y is a 63 year old, presenting for the following health issues:  Hypertension and Recheck Medication        4/10/2024    10:02 AM   Additional Questions   Roomed by Aurora   Accompanied by self         4/10/2024    Information    services provided? Yes   Language Hmong   Type of interpretation provided Face-to-face    name Jess Arlene    [employer]       Has been feeling generally well.    Last visit, lowered losartan from 50 to 25 mg daily due to hyperkalemia.      Not taking empagliflozin daily.  Takes every other day.  Worried about side effects.  Started taking this in mid-February.     Had accident last May 2023.  Back injection.  Concerned about steroid increasing blood glucose.    Last injection was February 2024.   "    Taking lantus 26 units daily.  BG around 150-160.  Before dinner and at bedtime.  Will change to before breakfast.      Blood pressure.  117/62 at home on 4/5/24.  All are under <140/90.          Objective    BP (!) 146/81   Pulse 92   Temp 97.8  F (36.6  C) (Oral)   Ht 1.623 m (5' 3.9\")   Wt 61.7 kg (136 lb)   SpO2 99%   BMI 23.42 kg/m    Body mass index is 23.42 kg/m .     GENERAL: alert and no distress  EYES: Eyes grossly normal to inspection.  No discharge or erythema, or obvious scleral/conjunctival abnormalities.  RESP: No audible wheeze, cough, or visible cyanosis.    SKIN: Visible skin clear. No significant rash, abnormal pigmentation or lesions.  NEURO: Cranial nerves grossly intact.  Mentation and speech appropriate for age.  PSYCH: Appropriate affect, tone, and pace of words      Results for orders placed or performed in visit on 04/10/24   Basic metabolic panel     Status: Abnormal   Result Value Ref Range    Sodium 145 135 - 145 mmol/L    Potassium 5.7 (H) 3.4 - 5.3 mmol/L    Chloride 110 (H) 94 - 109 mmol/L    Carbon Dioxide (CO2) 23 20 - 32 mmol/L    Anion Gap 12 3 - 14 mmol/L    Urea Nitrogen 44 (H) 7 - 30 mg/dL    Creatinine 2.00 (H) 0.66 - 1.25 mg/dL    GFR Estimate 37 (L) >60 mL/min/1.73m2    Calcium 9.9 8.5 - 10.1 mg/dL    Glucose 110 (H) 70 - 99 mg/dL   CBC with platelets     Status: Abnormal   Result Value Ref Range    WBC Count 9.8 4.0 - 11.0 10e3/uL    RBC Count 3.07 (L) 4.40 - 5.90 10e6/uL    Hemoglobin 9.6 (L) 13.3 - 17.7 g/dL    Hematocrit 28.4 (L) 40.0 - 53.0 %    MCV 93 78 - 100 fL    MCH 31.3 26.5 - 33.0 pg    MCHC 33.8 31.5 - 36.5 g/dL    RDW 13.1 10.0 - 15.0 %    Platelet Count 219 150 - 450 10e3/uL   Ferritin     Status: Normal   Result Value Ref Range    Ferritin 287 31 - 409 ng/mL   Iron & Iron Binding Capacity     Status: Normal   Result Value Ref Range    Iron 88 61 - 157 ug/dL    Iron Binding Capacity 243 240 - 430 ug/dL    Iron Sat Index 36 15 - 46 %           Signed " Electronically by: Debbie Spangler MD

## 2024-04-10 ENCOUNTER — OFFICE VISIT (OUTPATIENT)
Dept: FAMILY MEDICINE | Facility: CLINIC | Age: 64
End: 2024-04-10
Payer: COMMERCIAL

## 2024-04-10 VITALS
BODY MASS INDEX: 23.22 KG/M2 | WEIGHT: 136 LBS | HEIGHT: 64 IN | HEART RATE: 92 BPM | DIASTOLIC BLOOD PRESSURE: 81 MMHG | SYSTOLIC BLOOD PRESSURE: 146 MMHG | TEMPERATURE: 97.8 F | OXYGEN SATURATION: 99 %

## 2024-04-10 DIAGNOSIS — R80.9 TYPE 2 DIABETES MELLITUS WITH MICROALBUMINURIA, WITH LONG-TERM CURRENT USE OF INSULIN (H): ICD-10-CM

## 2024-04-10 DIAGNOSIS — N18.32 STAGE 3B CHRONIC KIDNEY DISEASE (H): ICD-10-CM

## 2024-04-10 DIAGNOSIS — E11.29 TYPE 2 DIABETES MELLITUS WITH MICROALBUMINURIA, WITH LONG-TERM CURRENT USE OF INSULIN (H): ICD-10-CM

## 2024-04-10 DIAGNOSIS — I10 BENIGN ESSENTIAL HYPERTENSION: ICD-10-CM

## 2024-04-10 DIAGNOSIS — E87.5 HYPERKALEMIA: Primary | ICD-10-CM

## 2024-04-10 DIAGNOSIS — Z79.4 TYPE 2 DIABETES MELLITUS WITH MICROALBUMINURIA, WITH LONG-TERM CURRENT USE OF INSULIN (H): ICD-10-CM

## 2024-04-10 LAB
ANION GAP SERPL CALCULATED.3IONS-SCNC: 12 MMOL/L (ref 3–14)
BUN SERPL-MCNC: 44 MG/DL (ref 7–30)
CALCIUM SERPL-MCNC: 9.9 MG/DL (ref 8.5–10.1)
CHLORIDE BLD-SCNC: 110 MMOL/L (ref 94–109)
CO2 SERPL-SCNC: 23 MMOL/L (ref 20–32)
CREAT SERPL-MCNC: 2 MG/DL (ref 0.66–1.25)
EGFRCR SERPLBLD CKD-EPI 2021: 37 ML/MIN/1.73M2
ERYTHROCYTE [DISTWIDTH] IN BLOOD BY AUTOMATED COUNT: 13.1 % (ref 10–15)
GLUCOSE BLD-MCNC: 110 MG/DL (ref 70–99)
HCT VFR BLD AUTO: 28.4 % (ref 40–53)
HGB BLD-MCNC: 9.6 G/DL (ref 13.3–17.7)
MCH RBC QN AUTO: 31.3 PG (ref 26.5–33)
MCHC RBC AUTO-ENTMCNC: 33.8 G/DL (ref 31.5–36.5)
MCV RBC AUTO: 93 FL (ref 78–100)
PLATELET # BLD AUTO: 219 10E3/UL (ref 150–450)
POTASSIUM BLD-SCNC: 5.7 MMOL/L (ref 3.4–5.3)
RBC # BLD AUTO: 3.07 10E6/UL (ref 4.4–5.9)
SODIUM SERPL-SCNC: 145 MMOL/L (ref 135–145)
WBC # BLD AUTO: 9.8 10E3/UL (ref 4–11)

## 2024-04-10 PROCEDURE — 36415 COLL VENOUS BLD VENIPUNCTURE: CPT | Performed by: FAMILY MEDICINE

## 2024-04-10 PROCEDURE — 99214 OFFICE O/P EST MOD 30 MIN: CPT | Performed by: FAMILY MEDICINE

## 2024-04-10 PROCEDURE — 80048 BASIC METABOLIC PNL TOTAL CA: CPT | Performed by: FAMILY MEDICINE

## 2024-04-10 PROCEDURE — 82728 ASSAY OF FERRITIN: CPT | Performed by: FAMILY MEDICINE

## 2024-04-10 PROCEDURE — 83550 IRON BINDING TEST: CPT | Performed by: FAMILY MEDICINE

## 2024-04-10 PROCEDURE — 83540 ASSAY OF IRON: CPT | Performed by: FAMILY MEDICINE

## 2024-04-10 PROCEDURE — 85027 COMPLETE CBC AUTOMATED: CPT | Performed by: FAMILY MEDICINE

## 2024-04-10 RX ORDER — CHLORTHALIDONE 25 MG/1
25 TABLET ORAL DAILY
Qty: 90 TABLET | Refills: 1 | Status: SHIPPED | OUTPATIENT
Start: 2024-04-10 | End: 2024-04-24

## 2024-04-11 LAB
FERRITIN SERPL-MCNC: 287 NG/ML (ref 31–409)
IRON BINDING CAPACITY (ROCHE): 243 UG/DL (ref 240–430)
IRON SATN MFR SERPL: 36 % (ref 15–46)
IRON SERPL-MCNC: 88 UG/DL (ref 61–157)

## 2024-04-23 NOTE — PROGRESS NOTES
Assessment & Plan     Stage 3b chronic kidney disease (H) with proteinuria  Hyperkalemia  Potassium level has improved today, may be due to dietary changes.  Renal function is also slightly improved with a GFR of 42.  Has normal PTH, iron studies.    -Continue blood pressure control  -Continue diabetes control  -Continue empagliflozin and losartan for renal protection  -If he becomes hyperkalemic with a potassium above 5.5 in the future, consider starting Patiromer  - have recommended renal consult at multiple visits but patient declines.     Type 2 diabetes mellitus with microalbuminuria, with long-term current use of insulin (H)  Hemoglobin A1c was 8.1% in mid February.  He continues to check blood glucose levels which average around 150 fasting.  Would like to get a little better control.  He has been intermittent about taking empagliflozin.  However, is consistently taking Lantus and metformin without problem.  - empagliflozin (JARDIANCE) 10 MG TABS tablet; Take 1 tablet (10 mg) by mouth daily  - CONTOUR NEXT TEST test strip; USE AS DIRECTED ONCE DAILY // 1 HNUB SIV 1 ZAUG RAWS LI QHIA  - atorvastatin (LIPITOR) 40 MG tablet; Take 1 tablet (40 mg) by mouth daily  - metFORMIN (GLUCOPHAGE) 500 MG tablet; Take 1 tablet (500 mg) by mouth 2 times daily (with meals)  -Continue Lantus 26 units daily    Benign essential hypertension  Blood pressure is well above goal of less than 140/90.  He seems to be taking losartan but not chlorthalidone at the moment.  Per his report, home blood pressures are 130s to 140s systolic which is at his goal.  No changes in medications today.  - chlorthalidone (HYGROTON) 25 MG tablet; Take 1 tablet (25 mg) by mouth daily  - losartan (COZAAR) 25 MG tablet; Take 1 tablet (25 mg) by mouth daily    Anemia, unspecified type  Hemoglobin is decreased slightly to 9.6 from 10.4.  Normal iron studies.    - declines colonoscopy or FIT  - Vitamin B12 today    Return in about 4 weeks (around  "5/22/2024) for Follow up.    Debbie Spangler MD    The longitudinal plan of care for the diagnosis(es)/condition(s) as documented were addressed during this visit. Due to the added complexity in care, I will continue to support Chace in the subsequent management and with ongoing continuity of care.        Subjective   Chace is a 63 year old, presenting for the following health issues:  Blood Draw (Blood work. And patient states no other questions at this time. Waiting to see what the bloodwork says.) and Recheck Medication (Patient would like to review medications with provider today.)        4/24/2024    10:23 AM   Additional Questions   Roomed by Brook         4/24/2024    Information    services provided? Yes   Language Hmong   Type of interpretation provided Face-to-face    name Rosario Souza    [employer]     Patient is here today to follow-up on blood pressure, kidney function and hyperkalemia.  He has been feeling well has made some adjustments to his diet to reduce his potassium.  He is currently taking metformin, insulin (Lantus) and losartan.  He ran out of his Jardiance and chlorthalidone and atorvastatin.  He is unsure if there are refills on the bottles.    He has been testing his blood glucose levels which have been around 150-160 fasting.  Home blood pressure has been around 130-140 systolic.    Discussed that he has mild anemia, he says he is feeling well.  Has no shortness of breath, fatigue, chest pain, dizziness.  No blood in the stools.  No abdominal pain.          Objective    BP (!) 175/87   Pulse 86   Temp 97.4  F (36.3  C) (Tympanic)   Resp 20   Ht 1.62 m (5' 3.78\")   Wt 62.6 kg (138 lb)   SpO2 99%   BMI 23.85 kg/m    Body mass index is 23.85 kg/m .    GENERAL: alert and no distress  EYES: Eyes grossly normal to inspection.  No discharge or erythema, or obvious scleral/conjunctival abnormalities.  RESP: No audible wheeze, cough, or visible " cyanosis.    SKIN: Visible skin clear. No significant rash, abnormal pigmentation or lesions.  NEURO: Cranial nerves grossly intact.  Mentation and speech appropriate for age.  PSYCH: Appropriate affect, tone, and pace of words      Results for orders placed or performed in visit on 04/24/24 (from the past 24 hour(s))   Basic metabolic panel   Result Value Ref Range    Sodium 141 135 - 145 mmol/L    Potassium 5.2 3.4 - 5.3 mmol/L    Chloride 111 (H) 94 - 109 mmol/L    Carbon Dioxide (CO2) 22 20 - 32 mmol/L    Anion Gap 8 3 - 14 mmol/L    Urea Nitrogen 40 (H) 7 - 30 mg/dL    Creatinine 1.80 (H) 0.66 - 1.25 mg/dL    GFR Estimate 42 (L) >60 mL/min/1.73m2    Calcium 9.8 8.5 - 10.1 mg/dL    Glucose 137 (H) 70 - 99 mg/dL           Signed Electronically by: Debbie Spangler MD

## 2024-04-24 ENCOUNTER — OFFICE VISIT (OUTPATIENT)
Dept: FAMILY MEDICINE | Facility: CLINIC | Age: 64
End: 2024-04-24
Payer: COMMERCIAL

## 2024-04-24 VITALS
RESPIRATION RATE: 20 BRPM | HEART RATE: 86 BPM | OXYGEN SATURATION: 99 % | DIASTOLIC BLOOD PRESSURE: 87 MMHG | TEMPERATURE: 97.4 F | SYSTOLIC BLOOD PRESSURE: 175 MMHG | WEIGHT: 138 LBS | BODY MASS INDEX: 23.56 KG/M2 | HEIGHT: 64 IN

## 2024-04-24 DIAGNOSIS — E87.5 HYPERKALEMIA: ICD-10-CM

## 2024-04-24 DIAGNOSIS — E11.29 TYPE 2 DIABETES MELLITUS WITH MICROALBUMINURIA, WITH LONG-TERM CURRENT USE OF INSULIN (H): ICD-10-CM

## 2024-04-24 DIAGNOSIS — Z79.4 TYPE 2 DIABETES MELLITUS WITH MICROALBUMINURIA, WITH LONG-TERM CURRENT USE OF INSULIN (H): ICD-10-CM

## 2024-04-24 DIAGNOSIS — R80.9 TYPE 2 DIABETES MELLITUS WITH MICROALBUMINURIA, WITH LONG-TERM CURRENT USE OF INSULIN (H): ICD-10-CM

## 2024-04-24 DIAGNOSIS — N18.32 STAGE 3B CHRONIC KIDNEY DISEASE (H): Primary | ICD-10-CM

## 2024-04-24 DIAGNOSIS — I10 BENIGN ESSENTIAL HYPERTENSION: ICD-10-CM

## 2024-04-24 DIAGNOSIS — D64.9 ANEMIA, UNSPECIFIED TYPE: ICD-10-CM

## 2024-04-24 LAB
ANION GAP SERPL CALCULATED.3IONS-SCNC: 8 MMOL/L (ref 3–14)
BUN SERPL-MCNC: 40 MG/DL (ref 7–30)
CALCIUM SERPL-MCNC: 9.8 MG/DL (ref 8.5–10.1)
CHLORIDE BLD-SCNC: 111 MMOL/L (ref 94–109)
CO2 SERPL-SCNC: 22 MMOL/L (ref 20–32)
CREAT SERPL-MCNC: 1.8 MG/DL (ref 0.66–1.25)
EGFRCR SERPLBLD CKD-EPI 2021: 42 ML/MIN/1.73M2
GLUCOSE BLD-MCNC: 137 MG/DL (ref 70–99)
POTASSIUM BLD-SCNC: 5.2 MMOL/L (ref 3.4–5.3)
SODIUM SERPL-SCNC: 141 MMOL/L (ref 135–145)
VIT B12 SERPL-MCNC: 709 PG/ML (ref 232–1245)

## 2024-04-24 PROCEDURE — 99214 OFFICE O/P EST MOD 30 MIN: CPT | Performed by: FAMILY MEDICINE

## 2024-04-24 PROCEDURE — 80048 BASIC METABOLIC PNL TOTAL CA: CPT | Performed by: FAMILY MEDICINE

## 2024-04-24 PROCEDURE — 36415 COLL VENOUS BLD VENIPUNCTURE: CPT | Performed by: FAMILY MEDICINE

## 2024-04-24 PROCEDURE — G2211 COMPLEX E/M VISIT ADD ON: HCPCS | Performed by: FAMILY MEDICINE

## 2024-04-24 PROCEDURE — 82607 VITAMIN B-12: CPT | Performed by: FAMILY MEDICINE

## 2024-04-24 RX ORDER — CHLORTHALIDONE 25 MG/1
25 TABLET ORAL DAILY
Qty: 30 TABLET | Refills: 5 | Status: SHIPPED | OUTPATIENT
Start: 2024-04-24 | End: 2024-07-05

## 2024-04-24 RX ORDER — LOSARTAN POTASSIUM 25 MG/1
25 TABLET ORAL DAILY
Qty: 30 TABLET | Refills: 3 | Status: SHIPPED | OUTPATIENT
Start: 2024-04-24 | End: 2024-07-05

## 2024-04-24 RX ORDER — ATORVASTATIN CALCIUM 40 MG/1
40 TABLET, FILM COATED ORAL DAILY
Qty: 30 TABLET | Refills: 11 | Status: SHIPPED | OUTPATIENT
Start: 2024-04-24

## 2024-04-24 NOTE — NURSING NOTE
Patient states that his BP readings at home are 130-150, even without taking his medications. And his blood pressure cuff is only about two months old.

## 2024-06-12 ENCOUNTER — OFFICE VISIT (OUTPATIENT)
Dept: FAMILY MEDICINE | Facility: CLINIC | Age: 64
End: 2024-06-12
Payer: COMMERCIAL

## 2024-06-12 VITALS
RESPIRATION RATE: 19 BRPM | HEART RATE: 86 BPM | OXYGEN SATURATION: 99 % | SYSTOLIC BLOOD PRESSURE: 97 MMHG | DIASTOLIC BLOOD PRESSURE: 56 MMHG | BODY MASS INDEX: 22.86 KG/M2 | TEMPERATURE: 98 F | HEIGHT: 63 IN | WEIGHT: 129 LBS

## 2024-06-12 DIAGNOSIS — N18.32 STAGE 3B CHRONIC KIDNEY DISEASE (H): ICD-10-CM

## 2024-06-12 DIAGNOSIS — Z79.4 TYPE 2 DIABETES MELLITUS WITH MICROALBUMINURIA, WITH LONG-TERM CURRENT USE OF INSULIN (H): Primary | ICD-10-CM

## 2024-06-12 DIAGNOSIS — I10 BENIGN ESSENTIAL HYPERTENSION: ICD-10-CM

## 2024-06-12 DIAGNOSIS — R80.9 TYPE 2 DIABETES MELLITUS WITH MICROALBUMINURIA, WITH LONG-TERM CURRENT USE OF INSULIN (H): Primary | ICD-10-CM

## 2024-06-12 DIAGNOSIS — E11.29 TYPE 2 DIABETES MELLITUS WITH MICROALBUMINURIA, WITH LONG-TERM CURRENT USE OF INSULIN (H): Primary | ICD-10-CM

## 2024-06-12 LAB
ANION GAP SERPL CALCULATED.3IONS-SCNC: 7 MMOL/L (ref 3–14)
BUN SERPL-MCNC: 60 MG/DL (ref 7–30)
CALCIUM SERPL-MCNC: 10.1 MG/DL (ref 8.5–10.1)
CHLORIDE BLD-SCNC: 105 MMOL/L (ref 94–109)
CO2 SERPL-SCNC: 24 MMOL/L (ref 20–32)
CREAT SERPL-MCNC: 2.5 MG/DL (ref 0.66–1.25)
EGFRCR SERPLBLD CKD-EPI 2021: 28 ML/MIN/1.73M2
GLUCOSE BLD-MCNC: 183 MG/DL (ref 70–99)
HBA1C MFR BLD: 8.6 % (ref 0–5.6)
POTASSIUM BLD-SCNC: 4.9 MMOL/L (ref 3.4–5.3)
SODIUM SERPL-SCNC: 136 MMOL/L (ref 135–145)

## 2024-06-12 PROCEDURE — 80048 BASIC METABOLIC PNL TOTAL CA: CPT | Performed by: FAMILY MEDICINE

## 2024-06-12 PROCEDURE — G2211 COMPLEX E/M VISIT ADD ON: HCPCS | Performed by: FAMILY MEDICINE

## 2024-06-12 PROCEDURE — 36415 COLL VENOUS BLD VENIPUNCTURE: CPT | Performed by: FAMILY MEDICINE

## 2024-06-12 PROCEDURE — 99214 OFFICE O/P EST MOD 30 MIN: CPT | Performed by: FAMILY MEDICINE

## 2024-06-12 PROCEDURE — 83036 HEMOGLOBIN GLYCOSYLATED A1C: CPT | Performed by: FAMILY MEDICINE

## 2024-06-12 RX ORDER — INSULIN GLARGINE 100 [IU]/ML
28 INJECTION, SOLUTION SUBCUTANEOUS AT BEDTIME
Qty: 30 ML | Refills: 1 | Status: SHIPPED | OUTPATIENT
Start: 2024-06-12

## 2024-06-12 NOTE — PROGRESS NOTES
Assessment & Plan        Type 2 diabetes mellitus with microalbuminuria, with long-term current use of insulin (H)  Hx of +Microalbuminuria, +neuropathy  Hx of diabetic foot ulcer, bilateral, 2022.  Required IV abx.    Hx of DKA  Possible retinopathy based on history - no records from eye doctor available.  - A1c relatively unchanged today at 8.2%.  Taking his medications every other day, takes insulin daily.  Encouraged him to take all medications every day.  -Increase empagliflozin from 10 mg to 25 mg daily  -Discontinue metformin due to renal disease  -Increase Lantus from 25 to 28 units daily  -Due for several vaccines which he declines    Stage 3b chronic kidney disease (H) with proteinuria  Worsening renal function, now with creatinine of 2.5, had been around 2.0 since February  Random Urine albumin of 345.  Suspect due to DM2 and HTN.  -Continue blood pressure control  -Continue diabetes control   -Continue empagliflozin and losartan for renal protection  -Discontinue metformin due to GFR of 28  -If he becomes hyperkalemic with a potassium above 5.5 in the future (has periodically been near 5.7), consider starting Patiromer  - have recommended renal consult at multiple visits but patient declines, offered again today.    Benign essential hypertension  Blood pressure is now at goal, almost slightly low.   Continue current medications for now as he is asymptomatic.   - chlorthalidone (HYGROTON) 25 MG tablet; Take 1 tablet (25 mg) by mouth daily  - losartan (COZAAR) 25 MG tablet; Take 1 tablet (25 mg) by mouth daily     Anemia, unspecified type  Hemoglobin is decreased slightly to 9.6 from 10.4.  Normal iron and vit B12 studies.    - declines colonoscopy or FIT    RTC 2 weeks to check medication changes.     Debbie Spangler MD    The longitudinal plan of care for the diagnosis(es)/condition(s) as documented were addressed during this visit. Due to the added complexity in care, I will continue to support Chace  in the subsequent management and with ongoing continuity of care.    ========================    Subjective   Chace is a 64 year old, presenting for the following health issues:  RECHECK (DM and BP) and Both ears itching at night        6/12/2024     9:50 AM   Additional Questions   Roomed by Karen   Accompanied by KJ         6/12/2024    Information    services provided? Yes   Language Hmong   Type of interpretation provided Face-to-face    name Lida Greenberg    ID [id number]    [employer]    phone number [phone]     Patient here today to follow-up on his diabetes, hypertension, kidney disease.    Eye concern-  Saw eye doctor a couple weeks ago and was told he had some bleeding in the back of the thigh.  He was referred to a specialist and has that appointment scheduled.  He notes that his vision became quite blurry a couple weeks ago.  Has a history of cataract surgery in the past.    Diabetes-  Point of care glucose monitoring with fasting -140.  He has been alternating days on his medications.  He does have Jardiance 10 mg and metformin 500 mg.  Also has atorvastatin 40 mg.  Again, taking these every other day.  He is worried about the effect of medications on his body if he takes them daily.    Hypertension-  Home blood pressure monitoring with systolic BP of 130-160.  Has chlorthalidone 25 mg and losartan 25 mg.  Taking these every other day.  As above, concerned about taking medications daily.      Stop metformin  Increase jardiance to 25 mg daily  Increase lantus to 28  Renal consult    8.6%         HTN -   Losartan 25 mg 1 pill per day (per chart, was supposed to take 50 mg/d).  No side effects  Does not have BP cuff  No chest pain, no HA, neuro sx.     CKD -   With proteinuria.   Has nocturia x 2        PMH   reviewed     PSH:   s/p cataract surgery 2023     SHANTELLE  Moved to [place] in 2016 from [place]   Not able to drive  Lives with girlfriend,  "son.  Former smoker = 10 pack years                   Objective    BP 97/56   Pulse 86   Temp 98  F (36.7  C)   Resp 19   Ht 1.6 m (5' 3\")   Wt 58.5 kg (129 lb)   SpO2 99%   BMI 22.85 kg/m    Body mass index is 22.85 kg/m .  Physical Exam   GENERAL: alert and no distress  RESP: lungs clear to auscultation - no rales, rhonchi or wheezes  CV: regular rate and rhythm, normal S1 S2, no S3 or S4, no murmur, click or rub, no peripheral edema  MS: no gross musculoskeletal defects noted, no edema    Results for orders placed or performed in visit on 06/12/24 (from the past 24 hour(s))   Hemoglobin A1c   Result Value Ref Range    Hemoglobin A1C 8.6 (H) 0.0 - 5.6 %   Basic metabolic panel   Result Value Ref Range    Sodium 136 135 - 145 mmol/L    Potassium 4.9 3.4 - 5.3 mmol/L    Chloride 105 94 - 109 mmol/L    Carbon Dioxide (CO2) 24 20 - 32 mmol/L    Anion Gap 7 3 - 14 mmol/L    Urea Nitrogen 60 (H) 7 - 30 mg/dL    Creatinine 2.50 (H) 0.66 - 1.25 mg/dL    GFR Estimate 28 (L) >60 mL/min/1.73m2    Calcium 10.1 8.5 - 10.1 mg/dL    Glucose 183 (H) 70 - 99 mg/dL           Signed Electronically by: Debbie Spangler MD    "

## 2024-07-02 ENCOUNTER — TRANSFERRED RECORDS (OUTPATIENT)
Dept: HEALTH INFORMATION MANAGEMENT | Facility: CLINIC | Age: 64
End: 2024-07-02
Payer: COMMERCIAL

## 2024-07-02 LAB — RETINOPATHY: POSITIVE

## 2024-07-03 NOTE — PROGRESS NOTES
Medication check - was taking meds every other day  Increased empagliflozin and insulin  Stopped metformin due to kidneys      {PROVIDER CHARTING PREFERENCE:665307}      Type 2 diabetes mellitus with microalbuminuria, with long-term current use of insulin (H)  Diabetic nephropathy with urine albumin of >300 in 2023 and 2024.  Hx of diabetic foot ulcer, bilateral, 2022.  Required IV abx.    Hx of DKA  Possible retinopathy based on history - no records from eye doctor available.  - A1c relatively unchanged today at 8.2%.  Taking his medications every other day, takes insulin daily.  Encouraged him to take all medications every day.  -Increase empagliflozin from 10 mg to 25 mg daily  -Discontinue metformin due to renal disease  -Increase Lantus from 25 to 28 units daily  -Due for several vaccines which he declines     Stage 3b chronic kidney disease (H) with proteinuria  Worsening renal function, now with creatinine of 2.5, had been around 2.0 since February  Random Urine albumin of 345.  Suspect due to DM2 and HTN.  -Continue blood pressure control  -Continue diabetes control   -Continue empagliflozin and losartan for renal protection  -Discontinue metformin due to GFR of 28  -If he becomes hyperkalemic with a potassium above 5.5 in the future (has periodically been near 5.7), consider starting Patiromer  - have recommended renal consult at multiple visits but patient declines, offered again today.     Benign essential hypertension  Blood pressure is now at goal, almost slightly low.   Continue current medications for now as he is asymptomatic.   - chlorthalidone (HYGROTON) 25 MG tablet; Take 1 tablet (25 mg) by mouth daily  - losartan (COZAAR) 25 MG tablet; Take 1 tablet (25 mg) by mouth daily     Anemia, unspecified type  Hemoglobin is decreased slightly to 9.6 from 10.4.  Normal iron and vit B12 studies.    - declines colonoscopy or FIT    ***    Subjective   Chace is a 64 year old with DM, HTN, CKD 3b, presenting  for the following health issues:  No chief complaint on file.  {(!) Visit Details have not yet been documented.  Please enter Visit Details and then use this list to pull in documentation. (Optional):673149}    Seen about 3 weeks ago for his diabetes, kidney disease, hypertension and changes were made to his medications due to uncontrolled diabetes with nephropathy.  His changes included increase of empagliflozin from 10 to 25 mg, discontinuation of metformin and increase of Lantus from 25 to 28 units daily.  Here today to follow-up on these medication changes.    Eye exam - moderate diabetic macular edema and severe nonproliferative diabetic retinopathy.  Recent treatment of macular edema with avastin.    Low BP today  Reducing potassium?    Good questions about eating    {MA/LPN/RN Pre-Provider Visit Orders- hCG/UA/Strep (Optional):441509}  {SUPERLIST (Optional):858010}  {additonal problems for provider to add (Optional):299561}    {ROS Picklists (Optional):475093}      Objective    There were no vitals taken for this visit.  There is no height or weight on file to calculate BMI.  Physical Exam   {Exam List (Optional):064693}    {Diagnostic Test Results (Optional):139553}        Signed Electronically by: Debbie Spangler MD  {Email feedback regarding this note to [email or web address]   :277025}    Answers submitted by the patient for this visit:  Patient Health Questionnaire (Submitted on 7/5/2024)  If you checked off any problems, how difficult have these problems made it for you to do your work, take care of things at home, or get along with other people?: Somewhat difficult  PHQ9 TOTAL SCORE: 12

## 2024-07-05 ENCOUNTER — OFFICE VISIT (OUTPATIENT)
Dept: FAMILY MEDICINE | Facility: CLINIC | Age: 64
End: 2024-07-05
Payer: COMMERCIAL

## 2024-07-05 VITALS
HEART RATE: 90 BPM | OXYGEN SATURATION: 99 % | RESPIRATION RATE: 18 BRPM | BODY MASS INDEX: 22.88 KG/M2 | HEIGHT: 64 IN | TEMPERATURE: 98.9 F | WEIGHT: 134 LBS | DIASTOLIC BLOOD PRESSURE: 51 MMHG | SYSTOLIC BLOOD PRESSURE: 91 MMHG

## 2024-07-05 DIAGNOSIS — E11.21 TYPE 2 DIABETES MELLITUS WITH DIABETIC NEPHROPATHY, WITH LONG-TERM CURRENT USE OF INSULIN (H): Primary | ICD-10-CM

## 2024-07-05 DIAGNOSIS — L97.512 DIABETIC ULCER OF TOE OF RIGHT FOOT ASSOCIATED WITH TYPE 2 DIABETES MELLITUS, WITH FAT LAYER EXPOSED (H): ICD-10-CM

## 2024-07-05 DIAGNOSIS — E11.621 DIABETIC ULCER OF TOE OF RIGHT FOOT ASSOCIATED WITH TYPE 2 DIABETES MELLITUS, WITH FAT LAYER EXPOSED (H): ICD-10-CM

## 2024-07-05 DIAGNOSIS — I10 BENIGN ESSENTIAL HYPERTENSION: ICD-10-CM

## 2024-07-05 DIAGNOSIS — N18.32 STAGE 3B CHRONIC KIDNEY DISEASE (H): ICD-10-CM

## 2024-07-05 DIAGNOSIS — Z79.4 TYPE 2 DIABETES MELLITUS WITH DIABETIC NEPHROPATHY, WITH LONG-TERM CURRENT USE OF INSULIN (H): Primary | ICD-10-CM

## 2024-07-05 LAB
ANION GAP SERPL CALCULATED.3IONS-SCNC: 8 MMOL/L (ref 3–14)
BUN SERPL-MCNC: 79 MG/DL (ref 7–30)
CALCIUM SERPL-MCNC: 9.8 MG/DL (ref 8.5–10.1)
CHLORIDE BLD-SCNC: 107 MMOL/L (ref 94–109)
CO2 SERPL-SCNC: 21 MMOL/L (ref 20–32)
CREAT SERPL-MCNC: 2.4 MG/DL (ref 0.66–1.25)
EGFRCR SERPLBLD CKD-EPI 2021: 30 ML/MIN/1.73M2
GLUCOSE BLD-MCNC: 233 MG/DL (ref 70–99)
POTASSIUM BLD-SCNC: 4.7 MMOL/L (ref 3.4–5.3)
SODIUM SERPL-SCNC: 136 MMOL/L (ref 135–145)

## 2024-07-05 PROCEDURE — 80048 BASIC METABOLIC PNL TOTAL CA: CPT | Performed by: FAMILY MEDICINE

## 2024-07-05 PROCEDURE — G2211 COMPLEX E/M VISIT ADD ON: HCPCS | Performed by: FAMILY MEDICINE

## 2024-07-05 PROCEDURE — 36415 COLL VENOUS BLD VENIPUNCTURE: CPT | Performed by: FAMILY MEDICINE

## 2024-07-05 PROCEDURE — 99214 OFFICE O/P EST MOD 30 MIN: CPT | Performed by: FAMILY MEDICINE

## 2024-07-05 RX ORDER — LOSARTAN POTASSIUM 25 MG/1
25 TABLET ORAL DAILY
Qty: 30 TABLET | Refills: 3 | Status: SHIPPED | OUTPATIENT
Start: 2024-07-05

## 2024-07-05 RX ORDER — CHLORTHALIDONE 25 MG/1
12.5 TABLET ORAL DAILY
Qty: 30 TABLET | Refills: 5 | Status: SHIPPED | OUTPATIENT
Start: 2024-07-05

## 2024-07-05 ASSESSMENT — PATIENT HEALTH QUESTIONNAIRE - PHQ9
10. IF YOU CHECKED OFF ANY PROBLEMS, HOW DIFFICULT HAVE THESE PROBLEMS MADE IT FOR YOU TO DO YOUR WORK, TAKE CARE OF THINGS AT HOME, OR GET ALONG WITH OTHER PEOPLE: SOMEWHAT DIFFICULT
SUM OF ALL RESPONSES TO PHQ QUESTIONS 1-9: 12
SUM OF ALL RESPONSES TO PHQ QUESTIONS 1-9: 12

## 2024-07-05 NOTE — PROGRESS NOTES
Assessment & Plan     Type 2 diabetes mellitus with diabetic nephropathy, with long-term current use of insulin (H)  Complications of nephropathy, neuropathy and retinopathy.  Blood sugars still above target range  -Adjust Lantus from 28 to 30 units daily  -Continue Empagliflizon 25 mg daily  -Education to limit fast foods  -Continue home BG checks    Stage 3b chronic kidney disease (H)  Suspect diabetic nephropathy with urine albumin of >300 in 2023 and 2024.  GFR stable at 30 today.  Creatinine 2.4    -Declined referral to kidney specialist.  -BMP next visit.  -continue losartan and empagliflozin  -follow low potassium diet    Benign essential hypertension  Blood pressure have been on the lower end for the last two visits.  - Decrease Chlorthalidone to 12.5 mg daily form 25 mg.       - Chlorthalidone (HYGROTON) 25 MG tablet; Take 0.5 tablets (12.5 mg) by mouth daily  - Continue losartan (COZAAR) 25 MG tablet; Take 1 tablet (25 mg) by mouth daily    Diabetic ulcer of toe of right foot associated with type 2 diabetes mellitus, with fat layer exposed (H) and Diabetic Neuropathy    Hx of diabetic foot ulcer, bilateral, 2022.  Required IV abx.    R big toe ulcer  Diminished lower extremity sensation on monofilament test.  -Urgent referral to podiatrist  -Dressing applied to right big toe.  -Educated on diabetic foot care including use socks and wound care.  -Provided dressing supplies for home wound care.  -care coordination referral for transportation concerns    Depression Screening Follow Up        7/5/2024     9:02 AM   PHQ   PHQ-9 Total Score 12   Q9: Thoughts of better off dead/self-harm past 2 weeks Not at all     Follow-up in 2-3 weeks to recheck medications and blood pressure.  Follow up on foot ulcer as well.    Kapil Orozco, MS3    Debbie Spangler MD    The longitudinal plan of care for the diagnosis(es)/condition(s) as documented were addressed during this visit. Due to the added complexity in care, I  will continue to support Chace in the subsequent management and with ongoing continuity of care.    =====================================      Subjective   Chace is a 64 year old here for a follow up visit for type 2 diabetes mellitus with neuropathy and retinopathy, stage 3b chronic kidney disease, Hypertension.  He complains of blurred vision especially in the right eye.    RECHECK      7/5/2024     9:12 AM   Additional Questions   Roomed by Lavern walker         7/5/2024    Information    services provided? Yes   Language Hmong   Type of interpretation provided Face-to-face    name lisa padron    [employer]    phone number [phone]        Chace is a 64 year old here for a follow up visit for type 2 diabetes mellitus with neuropathy and retinopathy, chronic kidney disease stage 3b, and Hypertension.    Seen about 3 weeks ago for his diabetes, kidney disease, hypertension and changes were made to his medications due to uncontrolled diabetes with nephropathy. His changes included increase of empagliflozin from 10 to 25 mg, discontinuation of metformin and increase of Lantus from 25 to 28 units daily. Here today to follow-up on these medication changes.     He complains of blurred vision especially in the right eye but was recently seen by an ophthalmologist. The blurred vision is worsened by light and improves in the dark. He does walking for exercise but eats fast foods quite often. He acknowledges of a painless ulcer on the right big toes but hasn't seen a podiatrist recently because of transportation challenges. Patient reports that home blood pressures usually range between 120-160 systolic and around 90s for diastolic.  Blood pressure in the office is low today but patient denies any lightheadedness or dizziness.  Home blood sugar checks average between 140-160 in the morning.      He is taking all of his medications as prescribed and is not missing any  "doses.     Reviewed recent ophthalmology appointment.  Found to have bilateral retinal edema which was more prominent in the right eye. Received anti-VEGF injection to right eye and he's to follow up with ophthalmology in the next 3-4 weeks.    Patient Active Problem List   Diagnosis    Type 2 diabetes mellitus with microalbuminuria, with long-term current use of insulin (H)    Cataract    Benign essential hypertension    Stage 3b chronic kidney disease (H)     Current Outpatient Medications   Medication Sig Dispense Refill    atorvastatin (LIPITOR) 40 MG tablet Take 1 tablet (40 mg) by mouth daily 30 tablet 11    chlorthalidone (HYGROTON) 25 MG tablet Take 0.5 tablets (12.5 mg) by mouth daily 30 tablet 5    empagliflozin (JARDIANCE) 25 MG TABS tablet Take 1 tablet (25 mg) by mouth daily 30 tablet 5    losartan (COZAAR) 25 MG tablet Take 1 tablet (25 mg) by mouth daily 30 tablet 3    CONTOUR NEXT TEST test strip USE AS DIRECTED ONCE DAILY // 1 HNUB SIV 1 ZAUG RAWS LI QHIA 100 strip 3    insulin pen needle (32G X 4 MM) 32G X 4 MM miscellaneous Use 1 pen needles daily or as directed. 90 each 3    LANTUS SOLOSTAR 100 UNIT/ML soln Inject 28 Units Subcutaneous at bedtime 30 mL 1     No current facility-administered medications for this visit.     Reviewed above medications which patient brings with him today and his medications reflect medication list.             Objective    BP 91/51   Pulse 90   Temp 98.9  F (37.2  C) (Oral)   Resp 18   Ht 1.62 m (5' 3.78\")   Wt 60.8 kg (134 lb)   SpO2 99%   BMI 23.16 kg/m    Body mass index is 23.16 kg/m .  Physical Exam   GENERAL: alert and no distress  RESP: lungs clear to auscultation - no rales, rhonchi or wheezes  CV: regular rate and rhythm, normal S1 S2, no S3 or S4, no murmur, click or rub, no peripheral edema.  Pedal pulses are palpable bilaterally.  MS: no gross musculoskeletal defects noted, no edema  SKIN: Dry lower extremity skin bilaterally but more prominent on " the right. Right big toe has a painless diabetic ulcer. The ulcer is on the plantar side in the crease of the big toe, it is deep extending beyond subcutaneous tissue and is not associated with any discharge.  No surrounding color change or swelling.   NEURO: Decreased monofilament sensation bilaterally especially on right distal toes plantar aspect.  PSYCH: mentation appears normal, affect normal/bright    Results for orders placed or performed in visit on 07/05/24 (from the past 24 hour(s))   Basic metabolic panel   Result Value Ref Range    Sodium 136 135 - 145 mmol/L    Potassium 4.7 3.4 - 5.3 mmol/L    Chloride 107 94 - 109 mmol/L    Carbon Dioxide (CO2) 21 20 - 32 mmol/L    Anion Gap 8 3 - 14 mmol/L    Urea Nitrogen 79 (H) 7 - 30 mg/dL    Creatinine 2.40 (H) 0.66 - 1.25 mg/dL    GFR Estimate 30 (L) >60 mL/min/1.73m2    Calcium 9.8 8.5 - 10.1 mg/dL    Glucose 233 (H) 70 - 99 mg/dL           Signed Electronically by: Debbie Spangler MD

## 2024-07-16 ENCOUNTER — APPOINTMENT (OUTPATIENT)
Dept: INTERPRETER SERVICES | Facility: CLINIC | Age: 64
End: 2024-07-16
Payer: COMMERCIAL

## 2024-10-24 ENCOUNTER — PATIENT OUTREACH (OUTPATIENT)
Dept: FAMILY MEDICINE | Facility: CLINIC | Age: 64
End: 2024-10-24
Payer: COMMERCIAL

## 2024-10-24 NOTE — TELEPHONE ENCOUNTER
Patient Quality Outreach    Patient is due for the following:   Diabetes -  A1C    Next Steps:   Schedule a office visit for A1C Check on 11/19/2024.    Type of outreach:    Phone, spoke to patient/parent.        Questions for provider review:    None           Mohit Nix MA  Chart routed to Care Team.

## 2024-11-19 ENCOUNTER — OFFICE VISIT (OUTPATIENT)
Dept: FAMILY MEDICINE | Facility: CLINIC | Age: 64
End: 2024-11-19
Payer: COMMERCIAL

## 2024-11-19 VITALS
OXYGEN SATURATION: 99 % | DIASTOLIC BLOOD PRESSURE: 81 MMHG | HEIGHT: 63 IN | SYSTOLIC BLOOD PRESSURE: 130 MMHG | WEIGHT: 141 LBS | BODY MASS INDEX: 24.98 KG/M2 | RESPIRATION RATE: 18 BRPM | HEART RATE: 92 BPM

## 2024-11-19 DIAGNOSIS — Z79.4 TYPE 2 DIABETES MELLITUS WITH DIABETIC NEPHROPATHY, WITH LONG-TERM CURRENT USE OF INSULIN (H): Primary | ICD-10-CM

## 2024-11-19 DIAGNOSIS — I10 BENIGN ESSENTIAL HYPERTENSION: ICD-10-CM

## 2024-11-19 DIAGNOSIS — N18.4 STAGE 4 CHRONIC KIDNEY DISEASE (H): ICD-10-CM

## 2024-11-19 DIAGNOSIS — E11.21 TYPE 2 DIABETES MELLITUS WITH DIABETIC NEPHROPATHY, WITH LONG-TERM CURRENT USE OF INSULIN (H): Primary | ICD-10-CM

## 2024-11-19 LAB
ANION GAP SERPL CALCULATED.3IONS-SCNC: 14 MMOL/L (ref 3–14)
BUN SERPL-MCNC: 84 MG/DL (ref 7–30)
CALCIUM SERPL-MCNC: 9.4 MG/DL (ref 8.5–10.1)
CHLORIDE BLD-SCNC: 100 MMOL/L (ref 94–109)
CO2 SERPL-SCNC: 20 MMOL/L (ref 20–32)
CREAT SERPL-MCNC: 3.4 MG/DL (ref 0.66–1.25)
EGFRCR SERPLBLD CKD-EPI 2021: 20 ML/MIN/1.73M2
EST. AVERAGE GLUCOSE BLD GHB EST-MCNC: 280 MG/DL
GLUCOSE BLD-MCNC: 329 MG/DL (ref 70–99)
HBA1C MFR BLD: 11.4 % (ref 0–5.6)
POTASSIUM BLD-SCNC: 4.7 MMOL/L (ref 3.4–5.3)
SODIUM SERPL-SCNC: 134 MMOL/L (ref 135–145)

## 2024-11-19 RX ORDER — INSULIN GLARGINE 100 [IU]/ML
30 INJECTION, SOLUTION SUBCUTANEOUS AT BEDTIME
Qty: 30 ML | Refills: 1 | Status: SHIPPED | OUTPATIENT
Start: 2024-11-19

## 2024-11-19 RX ORDER — CHLORTHALIDONE 25 MG/1
12.5 TABLET ORAL DAILY
Qty: 30 TABLET | Refills: 3 | Status: SHIPPED | OUTPATIENT
Start: 2024-11-19

## 2024-11-19 RX ORDER — LOSARTAN POTASSIUM 25 MG/1
25 TABLET ORAL DAILY
Qty: 30 TABLET | Refills: 3 | Status: SHIPPED | OUTPATIENT
Start: 2024-11-19

## 2024-11-19 RX ORDER — ATORVASTATIN CALCIUM 40 MG/1
40 TABLET, FILM COATED ORAL DAILY
Qty: 30 TABLET | Refills: 11 | Status: SHIPPED | OUTPATIENT
Start: 2024-11-19

## 2024-11-19 NOTE — PROGRESS NOTES
Assessment & Plan     Type 2 diabetes mellitus with diabetic nephropathy, with long-term current use of insulin (H)  Complications include diabetic nephropathy, neuropathy with history of diabetic foot ulcer and retinopathy.  Worsening glycemic control, A1c up from 8.6% to now 11.4%.    Patient reports taking insulin and Jardiance daily.  Denies any symptoms of hyper or hypoglycemia.  Today, will adjust medications and reduce Jardiance as it has limited glycemic effect at this point.  Will increase insulin from 26 units to 30 units.  Will need to continue to uptitrate.  - atorvastatin (LIPITOR) 40 MG tablet; Take 1 tablet (40 mg) by mouth daily.  - empagliflozin (JARDIANCE) 10 MG TABS tablet; Take 1 tablet (10 mg) by mouth daily.  - CONTOUR NEXT TEST test strip; USE AS DIRECTED ONCE DAILY // 1 HNUB SIV 1 ZAUG RAWS LI QHIA  - LANTUS SOLOSTAR 100 UNIT/ML soln; Inject 30 Units subcutaneously at bedtime.    Stage 4 chronic kidney disease (H)  Has underlying diabetic nephropathy.  Renal function is worsening and creatinine has bumped from his baseline of around 2.0 over the last year up to 2.5 in summer and now is 3.40.  Again, patient reports feeling well, no change in urination.  -Continue SGLT2 but will change dose of Jardiance from 25 mg to 10 mg daily  - Continue losartan 25 mg daily  -Again recommended referral to a kidney specialist but patient declines.  He prefers to stay in primary care at this time.    Benign essential hypertension  Blood pressure is currently at goal at 130/81 today.  He needs a new home blood pressure cuff.  No change in medications.  - chlorthalidone (HYGROTON) 25 MG tablet; Take 0.5 tablets (12.5 mg) by mouth daily.  - losartan (COZAAR) 25 MG tablet; Take 1 tablet (25 mg) by mouth daily.  - Home Blood Pressure Monitor Order for DME - ONLY FOR DME    Return in about 2 weeks (around 12/3/2024).    Debbie Spangler MD    The longitudinal plan of care for the diagnosis(es)/condition(s) as  "documented were addressed during this visit. Due to the added complexity in care, I will continue to support Chace in the subsequent management and with ongoing continuity of care.    ===========================      Subjective   Chace is a 64 year old, presenting for the following health issues:  Diabetes and Refill Request        11/19/2024     1:49 PM   Additional Questions   Roomed by Lavern walker         11/19/2024    Information    services provided? Yes   Type of interpretation provided Face-to-face    name Quinnia lobo    [employer] Mas is a 64 year old here for a follow up visit for type 2 diabetes mellitus with neuropathy and retinopathy, chronic kidney disease stage 3b, and Hypertension.     DM  On insulin 26 units daily and jardiance 25 mg daily  Glucometer fasting BG daily, needs test strips.  FBG today was 187. Lowest was 145, highest was 200.    No hypoglycemia.     Diabetic ulcer  R big toe healing.  Left great toe with recent toenail avulsion. He is soaking, using alcohol.   Has not seen podiatrist.  R gets more swollen.    No claudication, no change in color or temperature.     Bilateral retinal edema  Had injections done this morning at eye clinic.  Has had four total.  Says it is helping his vision a little bit.  Will be continuing to have injections.     HTN  Taking Chlorthalidone 12.5 mg daily, losartan 25 mg daily  Does check home BP.  But home machine not working currently.     Planning to celebrate Thanksgiving with family in area.     PMH and medications reviewed      Objective    /81   Pulse 92   Resp 18   Ht 1.6 m (5' 3\")   Wt 64 kg (141 lb)   SpO2 99%   BMI 24.98 kg/m    Body mass index is 24.98 kg/m .  Physical Exam   GENERAL: alert and no distress  RESP: lungs clear to auscultation - no rales, rhonchi or wheezes  CV: regular rate and rhythm, normal S1 S2, no S3 or S4, no murmur, click or rub, no peripheral edema  Diabetic " foot exam: DP absent right, PT absent right, reduced sensation throughout feet, nail exam shows partial avulsion of left great toenail.  Right great toe with almost completely healed wound.  Lower legs with dryness. Feet are warm bilaterally.     Results for orders placed or performed in visit on 11/19/24 (from the past 24 hours)   Hemoglobin A1c   Result Value Ref Range    Estimated Average Glucose 280 (H) <117 mg/dL    Hemoglobin A1C 11.4 (H) 0.0 - 5.6 %    Narrative    Results confirmed by repeat test.     Basic metabolic panel   Result Value Ref Range    Sodium 134 (L) 135 - 145 mmol/L    Potassium 4.7 3.4 - 5.3 mmol/L    Chloride 100 94 - 109 mmol/L    Carbon Dioxide (CO2) 20 20 - 32 mmol/L    Anion Gap 14 3 - 14 mmol/L    Urea Nitrogen 84 (H) 7 - 30 mg/dL    Creatinine 3.40 (H) 0.66 - 1.25 mg/dL    GFR Estimate 20 (L) >60 mL/min/1.73m2    Calcium 9.4 8.5 - 10.1 mg/dL    Glucose 329 (H) 70 - 99 mg/dL           Signed Electronically by: Debbie Spangler MD

## 2024-12-04 ENCOUNTER — OFFICE VISIT (OUTPATIENT)
Dept: FAMILY MEDICINE | Facility: CLINIC | Age: 64
End: 2024-12-04
Payer: COMMERCIAL

## 2024-12-04 VITALS
HEIGHT: 64 IN | DIASTOLIC BLOOD PRESSURE: 57 MMHG | TEMPERATURE: 98 F | OXYGEN SATURATION: 100 % | WEIGHT: 141 LBS | BODY MASS INDEX: 24.07 KG/M2 | RESPIRATION RATE: 18 BRPM | HEART RATE: 94 BPM | SYSTOLIC BLOOD PRESSURE: 102 MMHG

## 2024-12-04 DIAGNOSIS — Z79.4 TYPE 2 DIABETES MELLITUS WITH DIABETIC NEPHROPATHY, WITH LONG-TERM CURRENT USE OF INSULIN (H): ICD-10-CM

## 2024-12-04 DIAGNOSIS — N18.4 STAGE 4 CHRONIC KIDNEY DISEASE (H): Primary | ICD-10-CM

## 2024-12-04 DIAGNOSIS — I10 BENIGN ESSENTIAL HYPERTENSION: ICD-10-CM

## 2024-12-04 DIAGNOSIS — E11.21 TYPE 2 DIABETES MELLITUS WITH DIABETIC NEPHROPATHY, WITH LONG-TERM CURRENT USE OF INSULIN (H): ICD-10-CM

## 2024-12-04 LAB
ANION GAP SERPL CALCULATED.3IONS-SCNC: 10 MMOL/L (ref 3–14)
BUN SERPL-MCNC: 55 MG/DL (ref 7–30)
CALCIUM SERPL-MCNC: 9.5 MG/DL (ref 8.5–10.1)
CHLORIDE BLD-SCNC: 103 MMOL/L (ref 94–109)
CO2 SERPL-SCNC: 22 MMOL/L (ref 20–32)
CREAT SERPL-MCNC: 2.4 MG/DL (ref 0.66–1.25)
CREAT UR-MCNC: 101 MG/DL
EGFRCR SERPLBLD CKD-EPI 2021: 30 ML/MIN/1.73M2
GLUCOSE BLD-MCNC: 124 MG/DL (ref 70–99)
MICROALBUMIN UR-MCNC: 144 MG/L
MICROALBUMIN/CREAT UR: 142.57 MG/G CR (ref 0–17)
POTASSIUM BLD-SCNC: 4.4 MMOL/L (ref 3.4–5.3)
SODIUM SERPL-SCNC: 135 MMOL/L (ref 135–145)

## 2024-12-04 RX ORDER — INSULIN GLARGINE 100 [IU]/ML
32 INJECTION, SOLUTION SUBCUTANEOUS AT BEDTIME
Qty: 30 ML | Refills: 1 | Status: SHIPPED | OUTPATIENT
Start: 2024-12-04

## 2024-12-04 NOTE — PROGRESS NOTES
Assessment & Plan     Stage 4 chronic kidney disease (H)  Has underlying diabetic nephropathy.  Renal function has improved since last visit, now back to his baseline of 2-2.5  -Continue SGLT2 -Jardiance 10 mg daily  - Continue losartan 25 mg daily  - Have recommended referral to a kidney specialist but patient declines.  He prefers to stay in primary care at this time.  - Albumin Random Urine Quantitative with Creat Ratio    Type 2 diabetes mellitus with diabetic nephropathy, with long-term current use of insulin (H)  Complications include diabetic nephropathy, neuropathy with history of diabetic foot ulcer and retinopathy.  Worsening glycemic control, A1c up from 8.6% to 11.4%.at visit 2 weeks ago.  Has been doing well with increase of lantus.    - continue atorvastatin (LIPITOR) 40 MG tablet; Take 1 tablet (40 mg) by mouth daily.  - continue empagliflozin (JARDIANCE) 10 MG TABS tablet; Take 1 tablet (10 mg) by mouth daily.  - increase LANTUS SOLOSTAR 100 UNIT/ML soln; from 30 unit(s) to 32 unit(s)  subcutaneously at bedtime.  - consider starting GLP-1, briefly discussed today  - Albumin Random Urine Quantitative with Creat Ratio      Benign essential hypertension  Blood pressure is currently at goal today, slightly low but asymptomatic.  Continue current meds.   - chlorthalidone (HYGROTON) 25 MG tablet; Take 0.5 tablets (12.5 mg) by mouth daily.  - losartan (COZAAR) 25 MG tablet; Take 1 tablet (25 mg) by mouth daily.    Return in about 4-6 weeks     Declines flu, covid and pcv today.    Debbie Spangler MD    The longitudinal plan of care for the diagnosis(es)/condition(s) as documented were addressed during this visit. Due to the added complexity in care, I will continue to support Chace in the subsequent management and with ongoing continuity of care.    Subjective   Chace is a 64 year old, presenting for the following health issues:  RECHECK (On med)        12/4/2024     9:44 AM   Additional Questions   Roomed  "by Lavern walker         12/4/2024    Information    services provided? Yes   Language Hmong   Type of interpretation provided Face-to-face    [employer] [facility]        Diabetes with nephropathy, neuropathy, retinopathy  At last visit, reduced jardiance and increased lantus to 30 units.    He made both changes to his medications.  FBG yesterday 171 but this was higher than usual.   Usually is less than 150.   Denies any sores on his feet.    Feeling well.  No chest pain, dizziness, SOB.  No change in urination or LE swelling.     Reviewed medications and he is taking them all as prescribed.     Patient Active Problem List   Diagnosis    Type 2 diabetes mellitus with microalbuminuria, with long-term current use of insulin (H)    Cataract    Benign essential hypertension    Stage 3b chronic kidney disease (H)     Current Outpatient Medications   Medication Sig Dispense Refill    atorvastatin (LIPITOR) 40 MG tablet Take 1 tablet (40 mg) by mouth daily. 30 tablet 11    chlorthalidone (HYGROTON) 25 MG tablet Take 0.5 tablets (12.5 mg) by mouth daily. 30 tablet 3    empagliflozin (JARDIANCE) 10 MG TABS tablet Take 1 tablet (10 mg) by mouth daily. 30 tablet 5    LANTUS SOLOSTAR 100 UNIT/ML soln Inject 32 Units subcutaneously at bedtime. 30 mL 1    losartan (COZAAR) 25 MG tablet Take 1 tablet (25 mg) by mouth daily. 30 tablet 3    CONTOUR NEXT TEST test strip USE AS DIRECTED ONCE DAILY // 1 HNUB SIV 1 MONET SALINAS LI QHIA 100 strip 3    insulin pen needle (32G X 4 MM) 32G X 4 MM miscellaneous Use 1 pen needles daily or as directed. 90 each 3     No current facility-administered medications for this visit.           Objective    /57   Pulse 94   Temp 98  F (36.7  C) (Oral)   Resp 18   Ht 1.625 m (5' 3.98\")   Wt 64 kg (141 lb)   SpO2 100%   BMI 24.22 kg/m    Body mass index is 24.22 kg/m .    GENERAL: alert and no distress  RESP: lungs clear to auscultation - no rales, rhonchi or " wheezes  CV: regular rate and rhythm, normal S1 S2, no S3 or S4, no murmur, click or rub, no peripheral edema     Results for orders placed or performed in visit on 12/04/24 (from the past 24 hours)   Basic metabolic panel   Result Value Ref Range    Sodium 135 135 - 145 mmol/L    Potassium 4.4 3.4 - 5.3 mmol/L    Chloride 103 94 - 109 mmol/L    Carbon Dioxide (CO2) 22 20 - 32 mmol/L    Anion Gap 10 3 - 14 mmol/L    Urea Nitrogen 55 (H) 7 - 30 mg/dL    Creatinine 2.40 (H) 0.66 - 1.25 mg/dL    GFR Estimate 30 (L) >60 mL/min/1.73m2    Calcium 9.5 8.5 - 10.1 mg/dL    Glucose 124 (H) 70 - 99 mg/dL           Signed Electronically by: Debbie Spangler MD

## 2025-01-07 ENCOUNTER — TRANSFERRED RECORDS (OUTPATIENT)
Dept: HEALTH INFORMATION MANAGEMENT | Facility: CLINIC | Age: 65
End: 2025-01-07
Payer: COMMERCIAL

## 2025-02-12 ENCOUNTER — OFFICE VISIT (OUTPATIENT)
Dept: FAMILY MEDICINE | Facility: CLINIC | Age: 65
End: 2025-02-12
Payer: COMMERCIAL

## 2025-02-12 VITALS
DIASTOLIC BLOOD PRESSURE: 88 MMHG | WEIGHT: 139 LBS | HEART RATE: 92 BPM | TEMPERATURE: 98 F | OXYGEN SATURATION: 98 % | RESPIRATION RATE: 22 BRPM | SYSTOLIC BLOOD PRESSURE: 159 MMHG | BODY MASS INDEX: 23.73 KG/M2 | HEIGHT: 64 IN

## 2025-02-12 DIAGNOSIS — N18.32 STAGE 3B CHRONIC KIDNEY DISEASE (H): ICD-10-CM

## 2025-02-12 DIAGNOSIS — E11.29 TYPE 2 DIABETES MELLITUS WITH MICROALBUMINURIA, WITH LONG-TERM CURRENT USE OF INSULIN (H): Primary | ICD-10-CM

## 2025-02-12 DIAGNOSIS — I10 BENIGN ESSENTIAL HYPERTENSION: ICD-10-CM

## 2025-02-12 DIAGNOSIS — R80.9 TYPE 2 DIABETES MELLITUS WITH MICROALBUMINURIA, WITH LONG-TERM CURRENT USE OF INSULIN (H): Primary | ICD-10-CM

## 2025-02-12 DIAGNOSIS — D63.1 ANEMIA DUE TO STAGE 3B CHRONIC KIDNEY DISEASE (H): ICD-10-CM

## 2025-02-12 DIAGNOSIS — Z79.4 TYPE 2 DIABETES MELLITUS WITH MICROALBUMINURIA, WITH LONG-TERM CURRENT USE OF INSULIN (H): Primary | ICD-10-CM

## 2025-02-12 DIAGNOSIS — E87.5 HYPERKALEMIA: ICD-10-CM

## 2025-02-12 DIAGNOSIS — N18.32 ANEMIA DUE TO STAGE 3B CHRONIC KIDNEY DISEASE (H): ICD-10-CM

## 2025-02-12 LAB
ALBUMIN SERPL BCG-MCNC: 4.2 G/DL (ref 3.5–5.2)
ANION GAP SERPL CALCULATED.3IONS-SCNC: 13 MMOL/L (ref 3–14)
BUN SERPL-MCNC: 58 MG/DL (ref 7–30)
CALCIUM SERPL-MCNC: 9.3 MG/DL (ref 8.5–10.1)
CHLORIDE BLD-SCNC: 106 MMOL/L (ref 94–109)
CO2 SERPL-SCNC: 21 MMOL/L (ref 20–32)
CREAT SERPL-MCNC: 2.5 MG/DL (ref 0.66–1.25)
EGFRCR SERPLBLD CKD-EPI 2021: 28 ML/MIN/1.73M2
ERYTHROCYTE [DISTWIDTH] IN BLOOD BY AUTOMATED COUNT: 13 % (ref 10–15)
EST. AVERAGE GLUCOSE BLD GHB EST-MCNC: 275 MG/DL
GLUCOSE BLD-MCNC: 156 MG/DL (ref 70–99)
HBA1C MFR BLD: 11.2 % (ref 0–5.6)
HCT VFR BLD AUTO: 29.9 % (ref 40–53)
HGB BLD-MCNC: 9.9 G/DL (ref 13.3–17.7)
MCH RBC QN AUTO: 30.1 PG (ref 26.5–33)
MCHC RBC AUTO-ENTMCNC: 33.1 G/DL (ref 31.5–36.5)
MCV RBC AUTO: 91 FL (ref 78–100)
PLATELET # BLD AUTO: 197 10E3/UL (ref 150–450)
POTASSIUM BLD-SCNC: 6 MMOL/L (ref 3.4–5.3)
RBC # BLD AUTO: 3.29 10E6/UL (ref 4.4–5.9)
SODIUM SERPL-SCNC: 140 MMOL/L (ref 135–145)
VIT D+METAB SERPL-MCNC: 25 NG/ML (ref 20–50)
WBC # BLD AUTO: 11.3 10E3/UL (ref 4–11)

## 2025-02-12 RX ORDER — LOSARTAN POTASSIUM 25 MG/1
25 TABLET ORAL DAILY
Qty: 30 TABLET | Refills: 5 | Status: SHIPPED | OUTPATIENT
Start: 2025-02-12

## 2025-02-12 RX ORDER — CHLORTHALIDONE 25 MG/1
25 TABLET ORAL DAILY
Qty: 30 TABLET | Refills: 5 | Status: SHIPPED | OUTPATIENT
Start: 2025-02-12

## 2025-02-12 NOTE — Clinical Note
Hi green team - can you call this patient's pharmacy and make sure that they have renewals/refills for all of his medications?  Patient seems to not be aware that he should have refills of his medication and is missing some of them at home.   Thanks, Debbie Spangler MD

## 2025-02-12 NOTE — PROGRESS NOTES
Assessment & Plan     Type 2 diabetes mellitus with microalbuminuria, with long-term current use of insulin (H)  Complications include diabetic nephropathy, neuropathy with history of diabetic foot ulcer and retinopathy.  Worsening glycemic control over past 6 months, A1c over 11% since November and is again 11.2% today.  He did not increase insulin as discussed at his last appointment, is concerned about taking too many medications.  It is unclear if he is taking or has Jardiance at home.  Will call pharmacy to ensure he has medications available there.     - continue atorvastatin (LIPITOR) 40 MG tablet; Take 1 tablet (40 mg) by mouth daily.  - continue empagliflozin (JARDIANCE) 10 MG TABS tablet; Take 1 tablet (10 mg) by mouth daily.  - again recommended increase of lantus, he agreeable to increase Lantus to 32 units from 30 units at bedtime.  He is reluctant to increase further at this time  -Would like to start a GLP-1, but patient prefers to increase insulin.  Would need to be cautious given renal function.      Stage 3b chronic kidney disease (H)  Due to diabetic nephropathy.  Renal function at his baseline with creatinine of 2-2.5/GFR around 30.  Most recent urine albumin improved from 500 in 2023 to 142 in December 2024.  -Continue SGLT2 -Jardiance 10 mg daily  - Continue losartan 25 mg daily  - Have recommended referral to a kidney specialist but patient declines.  He prefers to stay in primary care at this time.  - ordered CBC, vitamin D, albumin, PTH.    Benign essential hypertension  Hyperkalemia  Blood pressure is elevated today, but it is likely due to medication nonadherence.  I suspect he does not have chlorthalidone currently.  Sending prescriptions for both losartan and chlorthalidone, will increase chlorthalidone to a full 25 mg daily due to his hyperkalemia.  - losartan (COZAAR) 25 MG tablet; Take 1 tablet (25 mg) by mouth daily.  - chlorthalidone (HYGROTON) 25 MG tablet; Take 1 tablet (25 mg)  by mouth daily.  - Basic metabolic panel; Future - check in 1 week    Continues to decline flu, covid and pcv vaccines and preventive care.    Return in about 1 week (around 2/19/2025) for lab.    ====================      Subjective   Chace is a 64 year old, presenting for the following health issues:  Diabetes (Blood sugars was 137 yesterday )        2/12/2025     9:09 AM   Additional Questions   Roomed by Ety   Accompanied by Self         2/12/2025    Information    services provided? Yes   Language Hmong   Type of interpretation provided Face-to-face    name Sarah Souza    [facility]     [facility] [place]     DM  Meds - taking medications (jardiance) without problem.  Using lantus 30 units daily (did not increase as discussed at last appt).  BG - checking every morning.   yesterday.  Range is 130-170.    Symptoms - none    HTN  BP elevated today.    Home monitor is broken.    Usually takes BP meds (losartan and chlorthalidone) around 10 am.    Says he only has 2 medications right now at home, not sure which ones.     Patient Active Problem List   Diagnosis    Type 2 diabetes mellitus with microalbuminuria, with long-term current use of insulin (H)    Cataract    Benign essential hypertension    Stage 3b chronic kidney disease (H)     Current Outpatient Medications   Medication Sig Dispense Refill    chlorthalidone (HYGROTON) 25 MG tablet Take 1 tablet (25 mg) by mouth daily. 30 tablet 5    losartan (COZAAR) 25 MG tablet Take 1 tablet (25 mg) by mouth daily. 30 tablet 5    atorvastatin (LIPITOR) 40 MG tablet Take 1 tablet (40 mg) by mouth daily. 30 tablet 11    CONTOUR NEXT TEST test strip USE AS DIRECTED ONCE DAILY // 1 HNUB SIV 1 ZAUG RAWS LI QHIA 100 strip 3    empagliflozin (JARDIANCE) 10 MG TABS tablet Take 1 tablet (10 mg) by mouth daily. 30 tablet 5    insulin pen needle (32G X 4 MM) 32G X 4 MM miscellaneous Use 1 pen needles daily or as directed. 90 each 3    LANTUS  "SOLOSTAR 100 UNIT/ML soln Inject 32 Units subcutaneously at bedtime. 30 mL 3     No current facility-administered medications for this visit.           Objective    BP (!) 159/88   Pulse 92   Temp 98  F (36.7  C)   Resp 22   Ht 1.619 m (5' 3.74\")   Wt 63 kg (139 lb)   SpO2 98%   BMI 24.05 kg/m    Body mass index is 24.05 kg/m .  Physical Exam   GENERAL: alert and no distress  RESP: lungs clear to auscultation - no rales, rhonchi or wheezes  CV: regular rate and rhythm, normal S1 S2, no S3 or S4, no murmur, click or rub, no peripheral edema     Results for orders placed or performed in visit on 02/12/25   Hemoglobin A1c     Status: Abnormal   Result Value Ref Range    Estimated Average Glucose 275 (H) <117 mg/dL    Hemoglobin A1C 11.2 (H) 0.0 - 5.6 %   Basic metabolic panel     Status: Abnormal   Result Value Ref Range    Sodium 140 135 - 145 mmol/L    Potassium 6.0 (H) 3.4 - 5.3 mmol/L    Chloride 106 94 - 109 mmol/L    Carbon Dioxide (CO2) 21 20 - 32 mmol/L    Anion Gap 13 3 - 14 mmol/L    Urea Nitrogen 58 (H) 7 - 30 mg/dL    Creatinine 2.50 (H) 0.66 - 1.25 mg/dL    GFR Estimate 28 (L) >60 mL/min/1.73m2    Calcium 9.3 8.5 - 10.1 mg/dL    Glucose 156 (H) 70 - 99 mg/dL       Transferred Records on 01/07/2025   Component Date Value Ref Range Status    RETINOPATHY 01/07/2025 POSITIVE (A)   Final     Lab Results   Component Value Date    A1C 11.4 11/19/2024    A1C 8.6 06/12/2024    A1C 8.1 02/14/2024    A1C 6.5 03/22/2023    A1C 7.5 12/19/2022      Recent Labs   Lab Test 02/14/24  0921 12/19/22  1543   CHOL 176 184   HDL 46 48   * 107*   TRIG 79 146               The longitudinal plan of care for the diagnosis(es)/condition(s) as documented were addressed during this visit. Due to the added complexity in care, I will continue to support Chace in the subsequent management and with ongoing continuity of care.    Signed Electronically by: Debbie Spangler MD    "

## 2025-02-19 ENCOUNTER — LAB (OUTPATIENT)
Dept: LAB | Facility: CLINIC | Age: 65
End: 2025-02-19
Payer: COMMERCIAL

## 2025-02-19 DIAGNOSIS — D63.1 ANEMIA DUE TO STAGE 3B CHRONIC KIDNEY DISEASE (H): ICD-10-CM

## 2025-02-19 DIAGNOSIS — E87.5 HYPERKALEMIA: ICD-10-CM

## 2025-02-19 DIAGNOSIS — N18.32 STAGE 3B CHRONIC KIDNEY DISEASE (H): ICD-10-CM

## 2025-02-19 DIAGNOSIS — N18.32 ANEMIA DUE TO STAGE 3B CHRONIC KIDNEY DISEASE (H): ICD-10-CM

## 2025-02-19 PROCEDURE — 82728 ASSAY OF FERRITIN: CPT

## 2025-02-19 PROCEDURE — 80048 BASIC METABOLIC PNL TOTAL CA: CPT

## 2025-02-19 PROCEDURE — 83970 ASSAY OF PARATHORMONE: CPT

## 2025-02-19 PROCEDURE — 36415 COLL VENOUS BLD VENIPUNCTURE: CPT

## 2025-02-20 LAB
ANION GAP SERPL CALCULATED.3IONS-SCNC: 13 MMOL/L (ref 7–15)
BUN SERPL-MCNC: 45.8 MG/DL (ref 8–23)
CALCIUM SERPL-MCNC: 9.1 MG/DL (ref 8.8–10.4)
CHLORIDE SERPL-SCNC: 105 MMOL/L (ref 98–107)
CREAT SERPL-MCNC: 2.12 MG/DL (ref 0.67–1.17)
EGFRCR SERPLBLD CKD-EPI 2021: 35 ML/MIN/1.73M2
FERRITIN SERPL-MCNC: 287 NG/ML (ref 31–409)
GLUCOSE SERPL-MCNC: 202 MG/DL (ref 70–99)
HCO3 SERPL-SCNC: 18 MMOL/L (ref 22–29)
POTASSIUM SERPL-SCNC: 4.9 MMOL/L (ref 3.4–5.3)
PTH-INTACT SERPL-MCNC: 82 PG/ML (ref 15–65)
SODIUM SERPL-SCNC: 136 MMOL/L (ref 135–145)

## 2025-03-24 ENCOUNTER — OFFICE VISIT (OUTPATIENT)
Dept: FAMILY MEDICINE | Facility: CLINIC | Age: 65
End: 2025-03-24
Payer: COMMERCIAL

## 2025-03-24 VITALS
BODY MASS INDEX: 24.92 KG/M2 | RESPIRATION RATE: 22 BRPM | HEART RATE: 102 BPM | TEMPERATURE: 97.7 F | DIASTOLIC BLOOD PRESSURE: 78 MMHG | HEIGHT: 64 IN | SYSTOLIC BLOOD PRESSURE: 152 MMHG | OXYGEN SATURATION: 98 % | WEIGHT: 146 LBS

## 2025-03-24 DIAGNOSIS — I87.8 VENOUS STASIS: ICD-10-CM

## 2025-03-24 DIAGNOSIS — L29.9 ITCHING: ICD-10-CM

## 2025-03-24 DIAGNOSIS — R21 RASH: Primary | ICD-10-CM

## 2025-03-24 DIAGNOSIS — T14.8XXA EXCORIATION: ICD-10-CM

## 2025-03-24 RX ORDER — FAMOTIDINE 20 MG/1
20 TABLET, FILM COATED ORAL 2 TIMES DAILY
Qty: 30 TABLET | Refills: 0 | Status: SHIPPED | OUTPATIENT
Start: 2025-03-24

## 2025-03-24 RX ORDER — FEXOFENADINE HCL 60 MG/1
60 TABLET, FILM COATED ORAL 2 TIMES DAILY
Qty: 30 TABLET | Refills: 0 | Status: SHIPPED | OUTPATIENT
Start: 2025-03-24

## 2025-03-24 RX ORDER — UREA 200 MG/G
CREAM TOPICAL PRN
Qty: 480 G | Refills: 0 | Status: SHIPPED | OUTPATIENT
Start: 2025-03-24

## 2025-03-24 RX ORDER — MINERAL OIL/HYDROPHIL PETROLAT
OINTMENT (GRAM) TOPICAL PRN
Qty: 396 G | Refills: 0 | Status: SHIPPED | OUTPATIENT
Start: 2025-03-24

## 2025-03-24 NOTE — PROGRESS NOTES
Preceptor Attestation:  Patient's case reviewed and discussed with the resident, Sheri Newton DO, and I personally evaluated the patient. I agree with written assessment and plan of care.    Supervising Physician:  Danii Oakley MD   [facility]

## 2025-03-24 NOTE — PROGRESS NOTES
Assessment & Plan     Rash  Itching  Excoriation  Venous stasis  Patient presents today for acute rash that he developed approximately 4 days ago.  Patient notes that he started taking over-the-counter loratadine 5 days ago.  The following day after taking this medicine, woke up with diffuse rash across body with extreme pruritus.  Rash most severe to his arms and lower legs, however has areas across his abdomen and back that are also affected and pruritic.  He has tried over-the-counter 1% hydrocortisone cream without any relief of his symptoms.  He denies any other new medications, any change in dietary habits, any change in detergents.  No other family members have this rash.  He denies any swelling of throat, difficulties breathing, fevers, chills, nausea, vomiting.  With onset of symptoms shortly after taking new medication, suspect rash is a reaction from this medication.  Advised on emollient lotion, urea cream for lower extremities which appear to also have some underlying venous stasis changes.  Given loratadine, will trial alternative antihistamine with Allegra as well as famotidine.  Advised on follow-up in 2 to 3 days to reassess.  If not improving, would consider oral prednisone.  - mineral oil-hydrophilic petrolatum (AQUAPHOR) external ointment; Apply topically as needed for dry skin or irritation.  - urea (GORMEL) 20 % external cream; Apply topically as needed (skin irritation). Apply to bilateral lower legs.  - fexofenadine (ALLEGRA) 60 MG tablet; Take 1 tablet (60 mg) by mouth 2 times daily.  - famotidine (PEPCID) 20 MG tablet; Take 1 tablet (20 mg) by mouth 2 times daily.        The use of Dragon/Known dictation services was used to construct the content of this note; any grammatical errors are non-intentional. Please contact the author directly if you are in need of any clarification.      Return in about 3 days (around 3/27/2025) for Follow up.    Valente Mas is a 64 year old,  "presenting for the following health issues:  Recheck Medication (Itching all over for about 4-5 days/Was taking Loratadine over the counter when rash started)        3/24/2025    11:09 AM   Additional Questions   Roomed by shanae priest   Accompanied by self         3/24/2025    Information    services provided? No     HPI      Rash - all over body    - 4-5 days   - very itchy   - started loratidine 10 mg 5 days ago, then one day later all over body, arms, legs, stomach, back   - 1% hydrocortisone not helping   -Noticed it is most itchy on his arms and legs   -No one else in the family has the symptoms   -No swelling of throat, difficulty breathing      Review of Systems  Constitutional, neuro, ENT, endocrine, pulmonary, cardiac, gastrointestinal, genitourinary, musculoskeletal, integument and psychiatric systems are negative, except as otherwise noted.      Objective    BP (!) 152/78 (BP Location: Right arm, Patient Position: Sitting, Cuff Size: Adult Regular)   Pulse 102   Temp 97.7  F (36.5  C) (Oral)   Resp 22   Ht 1.624 m (5' 3.94\")   Wt 66.2 kg (146 lb)   SpO2 98%   BMI 25.11 kg/m    Body mass index is 25.11 kg/m .  Physical Exam   GENERAL: healthy, alert and no distress  RESP: speaking in full sentences, normal work of breathing   CV: extremities well perfused  PSYCH: anxious appearing  SKIN: arms with erythematous coalesced plaques with dry irritation; legs with chronic appearing statis with overlying scaling and with some excoriation (see photos below)                            Signed Electronically by: Sheri Newton, DO    "

## 2025-03-26 ENCOUNTER — OFFICE VISIT (OUTPATIENT)
Dept: FAMILY MEDICINE | Facility: CLINIC | Age: 65
End: 2025-03-26
Payer: COMMERCIAL

## 2025-03-26 VITALS
SYSTOLIC BLOOD PRESSURE: 129 MMHG | DIASTOLIC BLOOD PRESSURE: 68 MMHG | RESPIRATION RATE: 18 BRPM | HEART RATE: 93 BPM | TEMPERATURE: 98.7 F | BODY MASS INDEX: 24.41 KG/M2 | OXYGEN SATURATION: 100 % | HEIGHT: 64 IN | WEIGHT: 143 LBS

## 2025-03-26 DIAGNOSIS — D63.1 ANEMIA DUE TO STAGE 3B CHRONIC KIDNEY DISEASE (H): ICD-10-CM

## 2025-03-26 DIAGNOSIS — Z00.00 ROUTINE GENERAL MEDICAL EXAMINATION AT A HEALTH CARE FACILITY: Primary | ICD-10-CM

## 2025-03-26 DIAGNOSIS — Z79.4 TYPE 2 DIABETES MELLITUS WITH MICROALBUMINURIA, WITH LONG-TERM CURRENT USE OF INSULIN (H): ICD-10-CM

## 2025-03-26 DIAGNOSIS — R21 RASH: ICD-10-CM

## 2025-03-26 DIAGNOSIS — N18.32 ANEMIA DUE TO STAGE 3B CHRONIC KIDNEY DISEASE (H): ICD-10-CM

## 2025-03-26 DIAGNOSIS — N18.4 STAGE 4 CHRONIC KIDNEY DISEASE (H): ICD-10-CM

## 2025-03-26 DIAGNOSIS — E11.29 TYPE 2 DIABETES MELLITUS WITH MICROALBUMINURIA, WITH LONG-TERM CURRENT USE OF INSULIN (H): ICD-10-CM

## 2025-03-26 DIAGNOSIS — N25.81 SECONDARY RENAL HYPERPARATHYROIDISM: ICD-10-CM

## 2025-03-26 DIAGNOSIS — R80.9 TYPE 2 DIABETES MELLITUS WITH MICROALBUMINURIA, WITH LONG-TERM CURRENT USE OF INSULIN (H): ICD-10-CM

## 2025-03-26 DIAGNOSIS — I10 BENIGN ESSENTIAL HYPERTENSION: ICD-10-CM

## 2025-03-26 LAB
ANION GAP SERPL CALCULATED.3IONS-SCNC: 12 MMOL/L (ref 7–15)
BUN SERPL-MCNC: 45.6 MG/DL (ref 8–23)
CALCIUM SERPL-MCNC: 9.1 MG/DL (ref 8.8–10.4)
CHLORIDE SERPL-SCNC: 106 MMOL/L (ref 98–107)
CHOLEST SERPL-MCNC: 142 MG/DL
CREAT SERPL-MCNC: 2.25 MG/DL (ref 0.67–1.17)
EGFRCR SERPLBLD CKD-EPI 2021: 32 ML/MIN/1.73M2
FASTING STATUS PATIENT QL REPORTED: YES
FASTING STATUS PATIENT QL REPORTED: YES
GLUCOSE SERPL-MCNC: 105 MG/DL (ref 70–99)
HCO3 SERPL-SCNC: 22 MMOL/L (ref 22–29)
HDLC SERPL-MCNC: 45 MG/DL
LDLC SERPL CALC-MCNC: 83 MG/DL
NONHDLC SERPL-MCNC: 97 MG/DL
PHOSPHATE SERPL-MCNC: 3.8 MG/DL (ref 2.5–4.5)
POTASSIUM SERPL-SCNC: 4.4 MMOL/L (ref 3.4–5.3)
SODIUM SERPL-SCNC: 140 MMOL/L (ref 135–145)
TRIGL SERPL-MCNC: 71 MG/DL

## 2025-03-26 RX ORDER — TRIAMCINOLONE ACETONIDE 1 MG/G
CREAM TOPICAL 2 TIMES DAILY
Qty: 30 G | Refills: 1 | Status: SHIPPED | OUTPATIENT
Start: 2025-03-26

## 2025-03-26 RX ORDER — PREDNISONE 20 MG/1
40 TABLET ORAL DAILY
Qty: 10 TABLET | Refills: 0 | Status: SHIPPED | OUTPATIENT
Start: 2025-03-26 | End: 2025-03-31

## 2025-03-26 SDOH — HEALTH STABILITY: PHYSICAL HEALTH: ON AVERAGE, HOW MANY DAYS PER WEEK DO YOU ENGAGE IN MODERATE TO STRENUOUS EXERCISE (LIKE A BRISK WALK)?: 7 DAYS

## 2025-03-26 SDOH — HEALTH STABILITY: PHYSICAL HEALTH: ON AVERAGE, HOW MANY MINUTES DO YOU ENGAGE IN EXERCISE AT THIS LEVEL?: 150+ MIN

## 2025-03-26 ASSESSMENT — SOCIAL DETERMINANTS OF HEALTH (SDOH): HOW OFTEN DO YOU GET TOGETHER WITH FRIENDS OR RELATIVES?: PATIENT DECLINED

## 2025-03-26 NOTE — PATIENT INSTRUCTIONS
"When starting the new medication for your rash, prednisone, increase your insulin dose to 34 units daily.      Patient Education   April Mario Xeeb Saib Xyuas Kom Tiv Thaiv Tus Kheej  Nov yog ib co april mario xeeb uas peb yeej meem muab marielos tib neeg pab lawv noj qab nyob zoo. Nikkie zaum koj pab pawg saib xyuas yuav muaj ib co april mario xeeb uas tshwj xeeb marielos koj nkaus xwb. Thov nrog koj pab pawg saib xyuas sib sim txog nikkie yam uas koj toob sophia kom tiv thaiv koj tus kheej.  Franck Coj Lub Neej  Es xaws xais ntau buddy 150 feeb txhua lub milan tiam (30 feeb txhua hnub, 5 hnub ib lub milan tiam).  Ua yam pab cov leeg muaj zog tuaj 2 zaug txhua lub milan tiam. Nikkie yam no pab koj tswj hwm koj lub cev qhov hnyav thiab tiv thaiv ntawm kab mob.  Txhob haus luam yeeb.  Pleev tshuaj tiv thaiv tshav kub kom thiaj tsis raug mob khees xaws ntawm nqaij tawv.  Txhua 2 mus marielos 7 xyoos yuav tau kuaj koj lub tsev marielos qhov radon. Radon yog ib wayne laure uas tsis muaj xim tsis muaj ntxhiab uas mob tau koj cov ntsws. Kom thiaj kawm ntxiv, mus saib [email or web address] thiab ntaus ntawv \"Radon in Homes.\"  Ceev cov phom kom tsis muaj mos txwv marielos hauv thiab muab xauv nickie hauv ib qho chaw nyab xeeb xws li lub txhoj, los yog muab xauv nickie thiab muab cov yawm sij zais nickie. Muab cov mos txwv xauv marielos hauv lwm qhov chaw nrug cov phom. Kom thiaj kawm ntxiv, mus saib [email or web address] thiab ntaus ntawv \"safe gun storage.\"  Franck Noj Qab Huv  Noj 5 qho txiv hmab txiv ntoo thiab zaub txhua hnub.  Noj nplem nplej, txhuv xim av thiab cov fawm muaj nplej (los theej nplem dawb, txhuv dawb, thiab fawm dawb).  Ua tib zoo noj calcium thiab vitamin D ntau. Saib cov ntawv lo marielos pob khoom noj thiab siv zog noj kom txog 100% RDA (qhov ntau hauv ib hnub).  Nicolasaj meem kuaj ntsuas  Txhua 6 lub hli mus kuaj hniav thiab muab tu.  Txhua xyoo mus saib koj pab pawg saib xyuas franck noj qab nyob zoo kom sib sim txog:  Ib yam dab tsi uas hloov ntawm koj txoj franck noj qab nyob zoo.  Cov tshuaj uas " koj pab pawg tau hais kom siv.  Franck saib xyuas kom tiv thaiv, franck npaj marielos tsev neeg, thiab yuav ua li camron tiv thaiv ntawm kab mob uas ntev.  Franck txhaj tshuaj (koob tshuaj tiv thaiv)   Cov koob tshuaj HPV (txog thaum muaj 28 xyoo), yog koj yeej tsis tau txais buddy li.  Cov koob tshuaj Hepatitis B Kab Mob Siab (txog thaum muaj 61 xyoos), yog koj yeej tsis tau txais buddy li.  Koob tshuaj COVID-19: Txais koob tshuaj no thaum txog caij txais.  Koob tshuaj tiv thaiv ntawm mob khaub thuas: Txais txhua xyoo.  Koob tshuaj tiv thaiv mob daig tsaig: Txais txhua 10 xyoo.  Pneumococcal, hepatitis A, thiab RSV cov koob tshuaj: Nug koj pab pawg saib xyuas amor puas tsim nyog marielos koj txais cov no raws li koj qhov pheej hmoo.  Koob tshuaj tiv thaiv ntawm kab mob sawv hlwv (marielos cov muaj 52 xyoo rov leyda).  Franck kuaj ntsuas marielos franck noj qab nyob zoo  Kuaj mob ntshav qab zib:  Pib thaum muaj 37 xyoos, Mus kuaj mob ntshav qab zib txhua 3 xyoos los yog ntau zog.  Yog koj tseem tsis tau txog 37 xyoos, nug koj pab pawg saib xyuas amor puas tsim nyog marielos koj kuaj mob ntshav qab zib.  Kuaj cholesterol: Thaum muaj 41 xyoo, pib kuaj cholesterol txhua 5 xyoos, los yog ntau buddy ntawd yog kws ariane mob qhia.  Kuaj txha qhov tuab (DEXA): Thaum txog 52 xyoo lawd, nug koj pab pawg saib xyuas amor puas tsim nyog marielos koj kuaj txha amor puas ruaj.  Kab Mob Siab Hepatitis C: Kuaj ib zaug hauv koj lub neej.  Kuaj Txoj Hlab Ntshav Hauv Plab: Nrog koj tus kws ariane mob sim txog franck kuaj ntsuas no yog koj:  Tau haus luam yeeb ib zaug li; thiab  Yog txiv neej; thiab  Nruab hnub nyoog 67 thiab 75.  Mob Sophia Cees (kis mob dhau ntawm franck sib deev)  Ua ntej muaj 26 xyoos: Nug koj pab pawg saib xyuas amor puas tsim nyog kuaj marielos mob sophia cees.  Clayton qab muaj 26 xyoos: Mus kuaj marielos mob sophia cees yog koj muaj franck pheej hmoo. Koadelaide muaj franck pheej hmoo marielos mob sophia cees (suav nrog HIV) yog:  Koj sib deev nrog ntau buddy ib tug neeg.  Koadelaide tsis siv cov hnab looj qau thaum sib deev.  Koadelaide  los yog koj tus khub deev kuaj pom tias muaj mob sophia cees lawm.  Yog koj muaj franck pheej hmoo marielos mob sophia cees HIV, nug txog cov tshuaj PrEP kom tiv thaiv ntawm HIV.  Mus kuaj marielos mob sophia cees HIV yam ntau buddy ib zaug hauv koj lub neej, txawm yog koj muaj franck pheej hmoo marielos mob sophia cees HIV los tsis muaj los xij.  Kuaj marielos mob khees xaws  Kuaj lub ncauj tsev me nyuam marielos mob khees xaws: Yog koj muaj ib lub ncauj tsev me nyuam, margarita yuav tau ib sij kuaj lub ncauj tsev me nyuam seb puas muaj mob khees xaws pib thaum muaj 23 xyoos. Neeg feem coob uas ib sij kuaj lub ncauj tsev me nyuam thiab tsis pom dab tsi txawv lawv tsum tau keny qab muaj 67 xyoos. Nrog koj tus kws ariane mob sib sim txog qhov no.  Kuaj lub mis marielos mob khees xaws (mammogram): Yog koj tau muaj mis buddy li, yuav tau ib sij kuaj lub mis pib thaum muaj 40 xyoo. Franck kuaj no yog kom saib seb puas muaj mob khees xaws hauv lub mis.  Kuaj Txoj Hnyuv Marielos Mob Khees Xaws: Yeej tseem ceeb pib kuaj txoj hnyuv marielos mob khees xaws pib thaum muaj 47 xyoos.  Txhua 12 xyoo yuav tau kuaj txoj hnyuv (los yog ntau zog yog koj muaj franck pheej hmoo) Los sis, nug koj tus kws ariane mob txog franck kuaj thooj quav FIT txhua xyoo los yog Cologuard txhua 5 xyoos.  Kom thiaj kawm ntxiv txog nikkie wayne kuaj no, mus saib: [email or web address].  Yog xav tau franck pab txog qhov no, mus saib: [email or web address].  Kuaj lub miguel angel kua phev (prostate) marielos mob khees xaws: Yog koj muaj ib lub miguel angel kua phev (prostate) thiab nruab hnub nyoog 57 mus marielos 71, nug koj tus kws ariane mob amor puas tsim nyog marielos koj kuaj lub miguel angel kua phev.  Kuaj ntsws marielos mob khees xaws: Yog koj haus luam yeeb mohan sim no los yog tau haus yav tas los uas muaj hnub nyoog nruab 50 xyoos mus marielos 80 xyoo, nug koj pab pawg saib xyuas amor puas tsim nyog marielos koj yeej meem kuaj lub ntsws marielos mob khees xaws.    Marielos cov wayne phiaj franck siv ua ntaub ntawv qhia nkaus xwb. Yuav tsis pauv franck qhia los ntawm koj qhov chaw ariane mob. Copyright (tuav  augusto)   2023 [facility].   Davonte poon tswj tseg law. Tshligiaj flip salguero nta [facility]. InPact.me [id number] - REV 04/24.

## 2025-03-26 NOTE — PROGRESS NOTES
Preventive Care Visit  [facility]  Debbie Spangler MD, Family Medicine  Mar 26, 2025      Assessment & Plan     Routine general medical examination at a health care facility  Reviewed patient's chronic health conditions today as well as a new concern about a rash for the last couple of weeks.  Patient has declined preventative care at this time, declines colon cancer screening and vaccines.  Discussed his mobility and independence.  Currently he is doing quite well.  He lives with a roommate and feels financially secure at the moment.  Is aware of resources where he to need any additional assistance in the future.    Rash  Interestingly, patient appears to have an allergic reaction to loratadine.  He was seen a few days ago and started famotidine with fexofenadine but this has not relieved his symptoms.  Will discontinue these medications and start prednisone today.  Also prescribed triamcinolone cream to use on small areas that are the most intensely pruritic.  - predniSONE (DELTASONE) 20 MG tablet; Take 2 tablets (40 mg) by mouth daily for 5 days.  - triamcinolone (KENALOG) 0.1 % external cream; Apply topically 2 times daily. Apply to affected areas on back and arms    Type 2 diabetes mellitus with microalbuminuria, with long-term current use of insulin (H)  Complications include diabetic nephropathy, neuropathy with history of diabetic foot ulcer and retinopathy. Worsening glycemic control over past 6 months, A1c over 11% since November and is again 11.2% last month.  Patient reports improving blood glucose levels at home, ranging 140-250.  Concern for increased glucose levels with initiation of prednisone.  Will increase Lantus insulin to 34 units daily.  Continue Jardiance 10 mg daily.  Have briefly discussed a GLP 1 in the past, but patient has been reluctant.   - Lipid panel reflex to direct LDL Non-fasting.  Currently taking atorvastatin 40 mg daily     Stage 4 chronic kidney  disease (H)  Due to diabetic nephropathy.  Renal function at his baseline with creatinine of 2-2.5/GFR around 30.  Most recent urine albumin improved from 500 in 2023 to 142 in December 2024.  - Continue SGLT2 -Jardiance 10 mg daily  - Continue losartan 25 mg daily  - Have recommended referral to a kidney specialist but patient declines.  He prefers to stay in primary care at this time.  - Basic metabolic panel ordered today  - Elevated PTH with normal calcium and vitamin D levels.  Ordered phosphorus today.     Anemia due to stage 3b chronic kidney disease (H)  Borderline hgb of 9.9 with ferritin >200 in February 2025.   Will hold off on starting epo given other concerns today.  Will recheck in 2 months.     Benign essential hypertension  Blood pressure is at goal today  - losartan (COZAAR) 25 MG tablet; Take 1 tablet (25 mg) by mouth daily.  - chlorthalidone (HYGROTON) 25 MG tablet; Take 1 tablet (25 mg) by mouth daily.       Patient has been advised of split billing requirements and indicates understanding: No      Counseling  Appropriate preventive services were addressed with this patient via screening, questionnaire, or discussion as appropriate for fall prevention, nutrition, physical activity, Tobacco-use cessation, social engagement, weight loss and cognition.  Checklist reviewing preventive services available has been given to the patient.  Reviewed patient's diet, addressing concerns and/or questions.       Return in about 1 week (around 4/2/2025) for Follow up.    ===========================    Subjective   Chace is a 64 year old, presenting for the following:  Physical and Recheck Medication (Giving patient rashes all over the body )        3/26/2025     7:58 AM   Additional Questions   Roomed by Lavern walker         3/26/2025    Information    services provided? Yes   Language Hmong   Type of interpretation provided Face-to-face    name Jake Lane    Agency [employer]    phone number [phone]          HPI    Rash -   About 1 week ago.  Had cold symptoms.  Took loratadine  The following day the rash started.   Rash is on abdomen and chest and arms.    Taking fexofenadine and famotidine but don't seem to be helping.    Vision has been worsening, started around time of rash.  More blurry.  Has some swelling in ankles since rash started.     DM -  BG levels have been variable, higher lately.   Max 260, minimum 140-150.  Taking lantus 32 unit(s) daily.  Taking jardiance.    HTN -   Taking chlorthalidone, losartan.            3/26/2025   General Health   How would you rate your overall physical health? (!) POOR   Feel stress (tense, anxious, or unable to sleep) Not at all         3/26/2025   Nutrition   Three or more servings of calcium each day? (!) NO   Diet: Regular (no restrictions)   How many servings of fruit and vegetables per day? 4 or more   How many sweetened beverages each day? 0-1         3/26/2025   Exercise   Days per week of moderate/strenous exercise 7 days   Average minutes spent exercising at this level 150+ min         3/26/2025   Social Factors   Frequency of gathering with friends or relatives Patient declined   Worry food won't last until get money to buy more No   Food not last or not have enough money for food? No   Do you have housing? (Housing is defined as stable permanent housing and does not include staying ouside in a car, in a tent, in an abandoned building, in an overnight shelter, or couch-surfing.) Yes   Are you worried about losing your housing? Yes   Lack of transportation? Yes   Unable to get utilities (heat,electricity)? No   Want help with housing or utility concern? No    (!) TRANSPORTATION CONCERN PRESENT(!) HOUSING CONCERN PRESENT      3/26/2025   Fall Risk   Fallen 2 or more times in the past year? No   Trouble with walking or balance? No          3/26/2025   Dental   Dentist two times every year? Yes           Today's  "PHQ-2 Score:       3/26/2025     8:01 AM   PHQ-2 ( 1999 [employer])   Q1: Little interest or pleasure in doing things 1   Q2: Feeling down, depressed or hopeless 1   PHQ-2 Score 2    Q1: Little interest or pleasure in doing things Several days   Q2: Feeling down, depressed or hopeless Several days   PHQ-2 Score 2       Patient-reported           3/26/2025   Substance Use   Alcohol more than 3/day or more than 7/wk No   Do you use any other substances recreationally? No     Social History     Tobacco Use    Smoking status: Former     Types: Cigarettes     Passive exposure: Never    Smokeless tobacco: Never    Tobacco comments:     10 cigs per day   Substance Use Topics    Alcohol use: No    Drug use: No           3/26/2025   STI Screening   New sexual partner(s) since last STI/HIV test? No   Last PSA: No results found for: \"PSA\"  ASCVD Risk   The 10-year ASCVD risk score (Emily REDMAN, et al., 2019) is: 20.8%    Values used to calculate the score:      Age: 64 years      Sex: Male      Is Non- : No      Diabetic: Yes      Tobacco smoker: No      Systolic Blood Pressure: 129 mmHg      Is BP treated: Yes      HDL Cholesterol: 46 mg/dL      Total Cholesterol: 176 mg/dL           Reviewed and updated as needed this visit by Provider                    Patient Active Problem List   Diagnosis    Type 2 diabetes mellitus with microalbuminuria, with long-term current use of insulin (H)    Cataract    Benign essential hypertension    Stage 3b chronic kidney disease (H)     Past Surgical History:   Procedure Laterality Date    CATARACT EXTRACTION Bilateral 2023       Social History     Tobacco Use    Smoking status: Former     Types: Cigarettes     Passive exposure: Never    Smokeless tobacco: Never    Tobacco comments:     10 cigs per day   Substance Use Topics    Alcohol use: No     Family History   Problem Relation Age of Onset    Diabetes Type 2  Mother              Review of " "Systems  Constitutional, HEENT, cardiovascular, pulmonary, gi and gu systems are negative, except as otherwise noted.     Objective    Exam  /68   Pulse 93   Temp 98.7  F (37.1  C)   Resp 18   Ht 1.625 m (5' 3.98\")   Wt 64.9 kg (143 lb)   SpO2 100%   BMI 24.56 kg/m     Estimated body mass index is 24.56 kg/m  as calculated from the following:    Height as of this encounter: 1.625 m (5' 3.98\").    Weight as of this encounter: 64.9 kg (143 lb).    Physical Exam  GENERAL: alert and no distress  EYES: Eyes grossly normal to inspection, PERRL and conjunctivae and sclerae normal  HENT: normal cephalic/atraumatic and both ears: clear effusion  NECK: no adenopathy, no asymmetry, masses, or scars  RESP: lungs clear to auscultation - no rales, rhonchi or wheezes  CV: regular rate and rhythm, normal S1 S2, no S3 or S4, no murmur, click or rub, no peripheral edema   ABDOMEN: soft, nontender, no hepatosplenomegaly, no masses and bowel sounds normal  SKIN: diffuse pink rash on forearms, slightly raised with some urticarial features.  Excoriation of left hand.  Smaller, patches of rash on back and abdomen.  Legs with redness, scaling.   PSYCH: mentation appears normal, affect normal/bright          Lab on 02/19/2025   Component Date Value Ref Range Status    Sodium 02/19/2025 136  135 - 145 mmol/L Final    Potassium 02/19/2025 4.9  3.4 - 5.3 mmol/L Final    Chloride 02/19/2025 105  98 - 107 mmol/L Final    Carbon Dioxide (CO2) 02/19/2025 18 (L)  22 - 29 mmol/L Final    Anion Gap 02/19/2025 13  7 - 15 mmol/L Final    Urea Nitrogen 02/19/2025 45.8 (H)  8.0 - 23.0 mg/dL Final    Creatinine 02/19/2025 2.12 (H)  0.67 - 1.17 mg/dL Final    GFR Estimate 02/19/2025 35 (L)  >60 mL/min/1.73m2 Final    eGFR calculated using 2021 CKD-EPI equation.    Calcium 02/19/2025 9.1  8.8 - 10.4 mg/dL Final    Glucose 02/19/2025 202 (H)  70 - 99 mg/dL Final    Parathyroid Hormone Intact 02/19/2025 82 (H)  15 - 65 pg/mL Final    Ferritin " 02/19/2025 287  31 - 409 ng/mL Final     Lab Results   Component Value Date    WBC 11.3 02/12/2025     Lab Results   Component Value Date    RBC 3.29 02/12/2025     Lab Results   Component Value Date    HGB 9.9 02/12/2025     Lab Results   Component Value Date    HCT 29.9 02/12/2025     Lab Results   Component Value Date    MCV 91 02/12/2025     Lab Results   Component Value Date    MCH 30.1 02/12/2025     Lab Results   Component Value Date    MCHC 33.1 02/12/2025     Lab Results   Component Value Date    RDW 13.0 02/12/2025     Lab Results   Component Value Date     02/12/2025       Signed Electronically by: Debbie Spangler MD

## 2025-04-08 ENCOUNTER — ALLIED HEALTH/NURSE VISIT (OUTPATIENT)
Dept: FAMILY MEDICINE | Facility: CLINIC | Age: 65
End: 2025-04-08
Payer: COMMERCIAL

## 2025-04-08 ENCOUNTER — NURSE TRIAGE (OUTPATIENT)
Dept: FAMILY MEDICINE | Facility: CLINIC | Age: 65
End: 2025-04-08

## 2025-04-08 VITALS
TEMPERATURE: 97.6 F | OXYGEN SATURATION: 100 % | RESPIRATION RATE: 16 BRPM | HEART RATE: 94 BPM | SYSTOLIC BLOOD PRESSURE: 166 MMHG | DIASTOLIC BLOOD PRESSURE: 93 MMHG

## 2025-04-08 DIAGNOSIS — Z78.9 TRIAGE ASSESSMENT CLASS 1, EMERGENT: Primary | ICD-10-CM

## 2025-04-08 PROCEDURE — 3077F SYST BP >= 140 MM HG: CPT

## 2025-04-08 PROCEDURE — 3080F DIAST BP >= 90 MM HG: CPT

## 2025-04-08 PROCEDURE — 99207 PR NO CHARGE NURSE ONLY: CPT

## 2025-04-08 NOTE — PROGRESS NOTES
Nurse Triage SBAR     Is this a 2nd Level Triage? YES, LICENSED PRACTITIONER REVIEW IS REQUIRED     Situation: chest pain     Background:   - patient walk-in clinic requesting appt for chest pain     Assessment:   - pain 10/10 in the middle of chest  - no fever  - vomiting  - dizziness  - sweating   - nausea   - patient could barely sit. He is slouching     Protocol Recommended Disposition:   Call  Now     Recommendation: offered to call 911 EMS but patient refused. Son is taking him to the ED now           Nain Peck, BSN RN  [facility]

## 2025-04-08 NOTE — TELEPHONE ENCOUNTER
"Nurse Triage SBAR    Is this a 2nd Level Triage? YES, LICENSED PRACTITIONER REVIEW IS REQUIRED    Situation: chest pain    Background:   - patient walk-in clinic requesting appt for chest pain    Assessment:   - pain 10/10 in the middle of chest  - no fever  - vomiting  - dizziness  - sweating   - nausea    Protocol Recommended Disposition:   Call  Now    Recommendation: offered to call 911 EMS but patient refused. Son is taking him to the ED now     Routed to provider    Does the patient meet one of the following criteria for ADS visit consideration? 16+ years old, with an [facility] PCP     TIP  Providers, please consider if this condition is appropriate for management at one of our Acute and Diagnostic Services sites.     If patient is a good candidate, please use dotphrase <dot>triageresponse and select Refer to ADS to document.    MYESHA Faulkner Cem, RN  [facility]      Reason for Disposition   Chest pain lasting longer than 5 minutes and ANY of the following:         Pain is crushing, pressure-like, or heavy         Over 44 years old          Over 30 years old and one cardiac risk factor (e.g diabetes, high blood pressure, high cholesterol, smoker, or family history of heart disease)         History of heart disease (e.g. angina, heart attack, heart failure, bypass surgery, takes nitroglycerin)    Additional Information   Negative: SEVERE difficulty breathing (e.g., struggling for each breath, speaks in single words)   Negative: Difficult to awaken or acting confused (e.g., disoriented, slurred speech)   Negative: Shock suspected (e.g., cold/pale/clammy skin, too weak to stand, low BP, rapid pulse)   Negative: Passed out (i.e., lost consciousness, collapsed and was not responding)    Answer Assessment - Initial Assessment Questions  1. LOCATION: \"Where does it hurt?\"        Center of chest     2. RADIATION: \"Does the pain go anywhere else?\" (e.g., into neck, jaw, arms, back)      A " "little to the back    3. ONSET: \"When did the chest pain begin?\" (Minutes, hours or days)       Yesterday    4. PATTERN: \"Does the pain come and go, or has it been constant since it started?\"  \"Does it get worse with exertion?\"       Constant     5. DURATION: \"How long does it last\" (e.g., seconds, minutes, hours)      Constant     6. SEVERITY: \"How bad is the pain?\"  (e.g., Scale 1-10; mild, moderate, or severe)     - MILD (1-3): doesn't interfere with normal activities      - MODERATE (4-7): interferes with normal activities or awakens from sleep     - SEVERE (8-10): excruciating pain, unable to do any normal activities        10 out of 10    7. CARDIAC RISK FACTORS: \"Do you have any history of heart problems or risk factors for heart disease?\" (e.g., angina, prior heart attack; diabetes, high blood pressure, high cholesterol, smoker, or strong family history of heart disease)      Diabetes    8. PULMONARY RISK FACTORS: \"Do you have any history of lung disease?\"  (e.g., blood clots in lung, asthma, emphysema, birth control pills)      No    9. CAUSE: \"What do you think is causing the chest pain?\"      Not sure     10. OTHER SYMPTOMS: \"Do you have any other symptoms?\" (e.g., dizziness, nausea, vomiting, sweating, fever, difficulty breathing, cough)        Vomiting, dizziness, nausea, a little sweating and some difficulty     11. PREGNANCY: \"Is there any chance you are pregnant?\" \"When was your last menstrual period?\"        No    Protocols used: Chest Pain-A-OH    "

## 2025-04-25 ENCOUNTER — APPOINTMENT (OUTPATIENT)
Dept: INTERPRETER SERVICES | Facility: CLINIC | Age: 65
End: 2025-04-25
Payer: COMMERCIAL

## 2025-05-21 ENCOUNTER — OFFICE VISIT (OUTPATIENT)
Dept: FAMILY MEDICINE | Facility: CLINIC | Age: 65
End: 2025-05-21
Payer: COMMERCIAL

## 2025-05-21 VITALS
WEIGHT: 141 LBS | RESPIRATION RATE: 18 BRPM | OXYGEN SATURATION: 99 % | HEART RATE: 96 BPM | DIASTOLIC BLOOD PRESSURE: 85 MMHG | SYSTOLIC BLOOD PRESSURE: 169 MMHG | BODY MASS INDEX: 24.22 KG/M2

## 2025-05-21 DIAGNOSIS — N18.32 STAGE 3B CHRONIC KIDNEY DISEASE (H): ICD-10-CM

## 2025-05-21 DIAGNOSIS — I10 ESSENTIAL HYPERTENSION: ICD-10-CM

## 2025-05-21 DIAGNOSIS — N18.32 ANEMIA DUE TO STAGE 3B CHRONIC KIDNEY DISEASE (H): ICD-10-CM

## 2025-05-21 DIAGNOSIS — I25.10 CORONARY ARTERY CALCIFICATION: ICD-10-CM

## 2025-05-21 DIAGNOSIS — Z79.4 TYPE 2 DIABETES MELLITUS WITH DIABETIC NEPHROPATHY, WITH LONG-TERM CURRENT USE OF INSULIN (H): ICD-10-CM

## 2025-05-21 DIAGNOSIS — D63.1 ANEMIA DUE TO STAGE 3B CHRONIC KIDNEY DISEASE (H): ICD-10-CM

## 2025-05-21 DIAGNOSIS — I10 BENIGN ESSENTIAL HYPERTENSION: ICD-10-CM

## 2025-05-21 DIAGNOSIS — Z09 HOSPITAL DISCHARGE FOLLOW-UP: Primary | ICD-10-CM

## 2025-05-21 DIAGNOSIS — A04.8 H. PYLORI INFECTION: ICD-10-CM

## 2025-05-21 DIAGNOSIS — H57.89 EYE IRRITATION: ICD-10-CM

## 2025-05-21 DIAGNOSIS — K20.90 ESOPHAGITIS: ICD-10-CM

## 2025-05-21 DIAGNOSIS — R60.0 LOCALIZED EDEMA: ICD-10-CM

## 2025-05-21 DIAGNOSIS — E11.21 TYPE 2 DIABETES MELLITUS WITH DIABETIC NEPHROPATHY, WITH LONG-TERM CURRENT USE OF INSULIN (H): ICD-10-CM

## 2025-05-21 LAB
ANION GAP SERPL CALCULATED.3IONS-SCNC: 7 MMOL/L (ref 3–14)
BUN SERPL-MCNC: 73 MG/DL (ref 7–30)
CALCIUM SERPL-MCNC: 9.7 MG/DL (ref 8.5–10.1)
CHLORIDE BLD-SCNC: 111 MMOL/L (ref 94–109)
CO2 SERPL-SCNC: 23 MMOL/L (ref 20–32)
CREAT SERPL-MCNC: 2.2 MG/DL (ref 0.66–1.25)
EGFRCR SERPLBLD CKD-EPI 2021: 33 ML/MIN/1.73M2
ERYTHROCYTE [DISTWIDTH] IN BLOOD BY AUTOMATED COUNT: 13 % (ref 10–15)
EST. AVERAGE GLUCOSE BLD GHB EST-MCNC: 189 MG/DL
GLUCOSE BLD-MCNC: 125 MG/DL (ref 70–99)
HBA1C MFR BLD: 8.2 % (ref 0–5.6)
HCT VFR BLD AUTO: 27.1 % (ref 40–53)
HGB BLD-MCNC: 8.6 G/DL (ref 13.3–17.7)
MCH RBC QN AUTO: 30.2 PG (ref 26.5–33)
MCHC RBC AUTO-ENTMCNC: 31.7 G/DL (ref 31.5–36.5)
MCV RBC AUTO: 95 FL (ref 78–100)
PLATELET # BLD AUTO: 210 10E3/UL (ref 150–450)
POTASSIUM BLD-SCNC: 5.4 MMOL/L (ref 3.4–5.3)
RBC # BLD AUTO: 2.85 10E6/UL (ref 4.4–5.9)
SODIUM SERPL-SCNC: 141 MMOL/L (ref 135–145)
WBC # BLD AUTO: 8.6 10E3/UL (ref 4–11)

## 2025-05-21 RX ORDER — CHLORTHALIDONE 25 MG/1
25 TABLET ORAL DAILY
Qty: 30 TABLET | Refills: 5 | Status: SHIPPED | OUTPATIENT
Start: 2025-05-21

## 2025-05-21 RX ORDER — LOSARTAN POTASSIUM 25 MG/1
25 TABLET ORAL DAILY
Qty: 30 TABLET | Refills: 5 | Status: SHIPPED | OUTPATIENT
Start: 2025-05-21

## 2025-05-21 RX ORDER — INSULIN GLARGINE 100 [IU]/ML
32 INJECTION, SOLUTION SUBCUTANEOUS AT BEDTIME
Qty: 30 ML | Refills: 3 | Status: SHIPPED | OUTPATIENT
Start: 2025-05-21

## 2025-05-21 NOTE — PROGRESS NOTES
Assessment & Plan     Hospital discharge follow-up  Hospitalized 6 weeks ago for esophagitis which was found to be due to H pylori.  Also had SHELBIE due to hypovolemia and urinary retention (possibly due to anticholinergics).      Esophagitis  H. pylori infection  Positive biopsy for H. pylori infection during hospitalization  Symptoms improved.  - Complete current medication regimen and recommended that he take medication as prescribed (rather than intermittently). Check stool antigen test after completion to confirm eradication.    Localized edema  New, unilateral right LE edema without pain.  Started after hospitalization.  Recommended stat US to rule out DVT.  Patient will consult with family before scheduling.  Discussed risk of PE.   - US Lower Extremity Venous Duplex Bilateral; Future    Type 2 diabetes mellitus with diabetic nephropathy, with long-term current use of insulin (H)  A1c improved from 11 to 8.2%.    Monitor blood sugar levels. Bring diabetes meter and medications to the next visit.  - empagliflozin (JARDIANCE) 10 MG TABS tablet; Take 1 tablet (10 mg) by mouth daily.  - LANTUS SOLOSTAR 100 UNIT/ML soln; Inject 32 Units subcutaneously at bedtime.    Stage 3b-4 chronic kidney disease (H)  Due to diabetic nephropathy.  Renal function at his baseline with creatinine of 2-2.5/GFR around 30.  Most recent urine albumin improved from 500 in 2023 to 142 in December 2024.  - Continue SGLT2 -Jardiance 10 mg daily  - Continue losartan 25 mg daily  - Have recommended referral to a kidney specialist but patient declines.  He prefers to stay in primary care at this time.    Anemia due to stage 3b chronic kidney disease (H)  Hgb baseline is around 9.  Today is 8.6, not much change from hospital discharge level of 8.9.  Have discussed concern about anemia (particularly in setting of coronary artery calcification as below).     Benign essential hypertension  Not currently taking medications  - losartan (COZAAR) 25  MG tablet; Take 1 tablet (25 mg) by mouth daily.  - chlorthalidone (HYGROTON) 25 MG tablet; Take 1 tablet (25 mg) by mouth daily.    Eye irritation  - Eye irritation with watery eyes, possibly related to allergies.  - Use Artificial Tears eye drops. Avoid over-the-counter allergy medications due to previous adverse reactions.  - dextran 70-hypromellose (TEARS NATURALE FREE PF) 0.1-0.3 % ophthalmic solution; Place 2 drops into both eyes daily as needed (eye symptoms).    Coronary artery calcification  Noted on CT scan.  Asymptomatic.   - continue risk factor management with atorvastatin 40 mg daily and BP control.  Did not discuss ASA today.  Consider ASA after eradication of H pylori    MED REC REQUIRED  Post Medication Reconciliation Status:  Unable to reconcile discharge medications   Patient did not bring medications and is not aware of names/dosages of medications.     Follow-up   after LE ultrasound    ================================    Subjective   Chace is a 65 year old, presenting for the following health issues:  Hospital F/U (Region in April )        5/21/2025     8:56 AM   Additional Questions   Roomed by Lavern walker         5/21/2025    Information    services provided? Yes   Language Hmong   Type of interpretation provided Face-to-face    name Connie Lane    [employer]    phone number [phone]     [facility]     Hospital Follow-up Visit:    Hospital/Nursing Home/IP Rehab Facility: [facility]  Date of Admission: 4/9/25   Date of Discharge: 4/12/25  Reason(s) for Admission:   Discharge diagnoses:   Chest Pain Likely 2/2 Esophagitis/Possible Gastroparesis  Leukocytosis  L1/L2 Transverse Process Fractures  Urinary Retention  SHELBIE on CKD3/4  DM2  Hlp  Chronic Anemia - discharge was 8.9  Htn  Moderate Malnutrition  Was the patient in the ICU or did the patient experience delirium during hospitalization?  No  Do you have any other stressors you would  like to discuss with your provider? Health Concerns    Problems taking medications regularly:  See below, not taking medication for H pylori as instructed  Medication changes since discharge: Bismuth quad therapy prescribed by GI team  Problems adhering to non-medication therapy:  None    Summary of hospitalization:    CareEverywhere information obtained and reviewed  Diagnostic Tests/Treatments reviewed.    Follow up needed:   F/u SHELBIE, urinary retention  F/u esophagitis, should have outpatient gastric emptying study  F/u diabetes  F/u L1/2 transverse process fractures   Other Healthcare Providers Involved in Patient s Care:         Imaging  Update since discharge: improved.    Additional issues: See below     Chace Lane, a 65-year-old male, with PMHx significant for insulin dependent DM2 (poorly controlled A1C = 11%), GERD, HTN, and CKD 4 was hospitalized for two nights starting April 9, 2025, at [facility] due to chest pain and heartburn.     Esophagitis/H pylori-  During the hospitalization, he was diagnosed with esophagitis on EGD and tested positive for H. Pylori infection on biopsy. He was prescribed bismuth quad therapy ( Zofran, doxy, bismuth, flagyl, omeprazole) on 4/16/25 for the infection but has been taking it inconsistently, sometimes skipping days.  He has not completed the course of antibiotics yet.   Also, it was recommended that he completed a gastric emptying study but patient has not scheduled this yet.      L1/L2 transverse process fractures-  Fell on the ice a week before his admission.  He was given lifting and twisting restrictions for 3-4 weeks.  Back pain has since improved.    SHELBIE and urinary retention -   Renal US w/out obstruction.  Per hospital notes, this may have been partly due to hypovolemia.  Of note, patient had been taking antihistamines for hives.   Cr on discharge was 2.15     Malnutrition -   Supplement twice daily was recommended at discharge.  Patient is not taking this.      New right leg swelling-  He reports a new symptom of swelling in his right foot, extending up to the knee, which began after his hospital stay. He has no pain associated with the swelling and has never experienced leg swelling before.     Eye irritation-  His eyes have been watering and he thinks this is due to allergies.  His allergies have worsened this year, causing him to feel nervous about taking over-the-counter medications due to previous side effects, including a rash.  He denies any nasal congestion.     DM2-  He notes that his blood sugar levels fluctuate significantly, often rising when he has rashes or is ill.  Most recent A1c 11% in February on lantus 32 units daily and jardiance.  He does check his BG regularly and it has been around 200.      HTN-   Has chlorthalidone and losartan prescribed but he is currently out of these medications. He does not monitor his blood pressure at home.   He is currently taking atorvastatin for cholesterol.    Plan of care communicated with patient                 Objective    BP (!) 169/85   Pulse 96   Resp 18   Wt 64 kg (141 lb)   SpO2 99%   BMI 24.22 kg/m    Body mass index is 24.22 kg/m .  Physical Exam   GENERAL: alert and no distress  RESP: lungs clear to auscultation - no rales, rhonchi or wheezes  CV: regular rates and rhythm, normal S1 S2, no S3 or S4, no murmur, click or rub, peripheral pulses strong, and 2+ right lower extremity pitting edema to knee.  No tenderness.  Skin of lower legs is dry and flaking.  Increased warmth on right but no redness of either leg.        Labs/imaging:  Reviewed labs and imaging from hospitalization on 4/9-4/12/25    EXAM: US RENAL W BLADDER   LOCATION: [facility]   DATE: 4/10/2025     INDICATION: Recent urinary retention s/p Sr placement, now with SHELBIE on CKD4 despite catheter.  Pls assess, ABNORMAL LABS   COMPARISON: CT abdomen and pelvis 4/8/2025.   TECHNIQUE: Routine Bilateral Renal and Bladder Ultrasound.      FINDINGS:     RIGHT KIDNEY: 10.8 x 4.7 x 5.1 cm. Adequate cortical thickness and echogenicity. No hydronephrosis. No masses.     LEFT KIDNEY: 10.9 x 5.8 x 4.8 cm. Adequate cortical thickness and echogenicity. No hydronephrosis. No masses.     BLADDER: Sr catheter within a decompressed bladder.     IMPRESSION:   1.  Kidneys are within normal limits. Specifically no hydronephrosis.         EXAM: CT ABD PELVIS W IV CONT  LOCATION: [facility]  DATE: 4/8/2025    INDICATION: Abdominal pain, N/V  COMPARISON: CTA chest, abdomen and pelvis 9/26/2019  TECHNIQUE: Helical enhanced CT scan of the abdomen and pelvis was performed following injection of IV contrast. Multiplanar reformats were obtained. Dose reduction techniques were used.  CONTRAST: IOHEXOL 350 MG/ML IV SOLN 100 mL    FINDINGS:  LOWER CHEST: Normal.    HEPATOBILIARY: Normal.    PANCREAS: Normal.    SPLEEN: Normal.    ADRENAL GLANDS: Normal.    KIDNEYS/BLADDER: Symmetric renal enhancement. No hydronephrosis. Increased bilateral perinephric fat stranding. Normal caliber bilateral ureters. Markedly distended bladder. No bladder wall thickening.    BOWEL: No obstruction or inflammatory change. Appendix is within normal limits.    LYMPH NODES: No lymphadenopathy.    VASCULATURE: Moderate, partially calcified atherosclerotic changes. No abdominal aortic aneurysm.    PELVIC ORGANS: Mild prostatic enlargement.    MUSCULOSKELETAL: Minimal hypertrophic changes, lower lumbar spine. Ununited transverse fracture of the left lateral processes of L1 and L2.    IMPRESSION:  1.  Increased bilateral perinephric fat stranding. This is nonspecific. Recommend correlation with urinalysis to assess for UTI. No hydronephrosis.  2.  Markedly distended bladder.  3.   Fractures of the left lateral processes of L1 and L2.    EXAM: CT ANGIO CHEST W IV CONT PE STUDY   LOCATION: [facility]   DATE: 4/9/2025     INDICATION: Elevated dimer, tachy. Chest discomfort   COMPARISON:  Chest radiograph 4/9/2025. CTA chest 9/26/2019.   TECHNIQUE: CT chest pulmonary angiogram during arterial phase injection of IV contrast. Multiplanar reformats and MIP reconstructions were performed. Dose reduction techniques were used.   CONTRAST: IOHEXOL 350 MG/ML IV SOLN 65 mL     FINDINGS:   ANGIOGRAM CHEST: No pulmonary embolus. No aortic dissection or aneurysm.     LUNGS AND PLEURA: Mild scarring at the lung apices. Lungs otherwise clear.     MEDIASTINUM/AXILLAE: Normal.     CORONARY ARTERY CALCIFICATION: Moderate to severe.     UPPER ABDOMEN: Normal.     MUSCULOSKELETAL: Normal.     IMPRESSION:   1. No pulmonary embolus or other acute process in the chest.   2.  Coronary artery disease.       Results for orders placed or performed in visit on 05/21/25   Basic metabolic panel     Status: Abnormal   Result Value Ref Range    Sodium 141 135 - 145 mmol/L    Potassium 5.4 (H) 3.4 - 5.3 mmol/L    Chloride 111 (H) 94 - 109 mmol/L    Carbon Dioxide (CO2) 23 20 - 32 mmol/L    Anion Gap 7 3 - 14 mmol/L    Urea Nitrogen 73 (H) 7 - 30 mg/dL    Creatinine 2.20 (H) 0.66 - 1.25 mg/dL    GFR Estimate 33 (L) >60 mL/min/1.73m2    Calcium 9.7 8.5 - 10.1 mg/dL    Glucose 125 (H) 70 - 99 mg/dL   CBC with platelets     Status: Abnormal   Result Value Ref Range    WBC Count 8.6 4.0 - 11.0 10e3/uL    RBC Count 2.85 (L) 4.40 - 5.90 10e6/uL    Hemoglobin 8.6 (L) 13.3 - 17.7 g/dL    Hematocrit 27.1 (L) 40.0 - 53.0 %    MCV 95 78 - 100 fL    MCH 30.2 26.5 - 33.0 pg    MCHC 31.7 31.5 - 36.5 g/dL    RDW 13.0 10.0 - 15.0 %    Platelet Count 210 150 - 450 10e3/uL   Hemoglobin A1c     Status: Abnormal   Result Value Ref Range    Estimated Average Glucose 189 (H) <117 mg/dL    Hemoglobin A1C 8.2 (H) 0.0 - 5.6 %    Narrative    Results confirmed by repeat test.             The longitudinal plan of care for the diagnosis(es)/condition(s) as documented were addressed during this visit. Due to the added complexity in care, I will continue to  support Chace in the subsequent management and with ongoing continuity of care.    90 minutes spent by me on the date of the encounter doing chart review, review of outside records, review of test results, interpretation of tests, patient visit, and documentation       Signed Electronically by: Debbie Spangler MD

## 2025-06-25 ENCOUNTER — OFFICE VISIT (OUTPATIENT)
Dept: FAMILY MEDICINE | Facility: CLINIC | Age: 65
End: 2025-06-25
Payer: COMMERCIAL

## 2025-06-25 VITALS
HEART RATE: 91 BPM | RESPIRATION RATE: 18 BRPM | TEMPERATURE: 98.1 F | WEIGHT: 141 LBS | HEIGHT: 64 IN | BODY MASS INDEX: 24.07 KG/M2 | DIASTOLIC BLOOD PRESSURE: 74 MMHG | OXYGEN SATURATION: 99 % | SYSTOLIC BLOOD PRESSURE: 150 MMHG

## 2025-06-25 DIAGNOSIS — R60.0 LOCALIZED EDEMA: ICD-10-CM

## 2025-06-25 DIAGNOSIS — Z79.4 TYPE 2 DIABETES MELLITUS WITH DIABETIC NEPHROPATHY, WITH LONG-TERM CURRENT USE OF INSULIN (H): ICD-10-CM

## 2025-06-25 DIAGNOSIS — I10 BENIGN ESSENTIAL HYPERTENSION: ICD-10-CM

## 2025-06-25 DIAGNOSIS — Z59.82 TRANSPORTATION INSECURITY: ICD-10-CM

## 2025-06-25 DIAGNOSIS — D63.1 ANEMIA DUE TO STAGE 3B CHRONIC KIDNEY DISEASE (H): ICD-10-CM

## 2025-06-25 DIAGNOSIS — Z59.86 FINANCIAL INSECURITY: ICD-10-CM

## 2025-06-25 DIAGNOSIS — A04.8 H. PYLORI INFECTION: Primary | ICD-10-CM

## 2025-06-25 DIAGNOSIS — N18.32 STAGE 3B CHRONIC KIDNEY DISEASE (H): ICD-10-CM

## 2025-06-25 DIAGNOSIS — E11.21 TYPE 2 DIABETES MELLITUS WITH DIABETIC NEPHROPATHY, WITH LONG-TERM CURRENT USE OF INSULIN (H): ICD-10-CM

## 2025-06-25 DIAGNOSIS — N18.32 ANEMIA DUE TO STAGE 3B CHRONIC KIDNEY DISEASE (H): ICD-10-CM

## 2025-06-25 DIAGNOSIS — K20.90 ESOPHAGITIS: ICD-10-CM

## 2025-06-25 LAB
CREAT UR-MCNC: 43 MG/DL
MICROALBUMIN UR-MCNC: 127 MG/L
MICROALBUMIN/CREAT UR: 295.35 MG/G CR (ref 0–17)

## 2025-06-25 RX ORDER — INSULIN GLARGINE 100 [IU]/ML
34 INJECTION, SOLUTION SUBCUTANEOUS AT BEDTIME
Qty: 30 ML | Refills: 3 | Status: SHIPPED | OUTPATIENT
Start: 2025-06-25

## 2025-06-25 SDOH — ECONOMIC STABILITY - TRANSPORTATION SECURITY: TRANSPORTATION INSECURITY: Z59.82

## 2025-06-25 SDOH — ECONOMIC STABILITY - INCOME SECURITY: FINANCIAL INSECURITY: Z59.86

## 2025-06-25 NOTE — PATIENT INSTRUCTIONS
Schedule ultrasound of leg.    Increase lantus insulin from 32 units to 34 units every day.  If blood glucose level is less than 80, then reduce dose back to 32 units.    Return stool test for H pylori infection.    Someone will call you about your financial and transportation concerns.    Bring medications and meter to next appointment in one month.

## 2025-06-25 NOTE — PROGRESS NOTES
Assessment & Plan     H. pylori infection  Esophagitis  Positive biopsy on EGD for H. pylori infection during hospitalization  Symptoms improved despite incomplete treatment with quad therapy.  - Test of cure ordered today:    - Helicobacter pylori Antigen Stool; Future  - Helicobacter pylori Antigen Stool    Localized edema  Unilateral right LE edema without pain.  Started after hospitalization 2 months ago.  At appointment last month, had recommended stat US to rule out DVT but patient was not able to have this done due to problems with transportation. He agrees to schedule this now.   - US Lower Extremity Venous Duplex Bilateral; Future    Type 2 diabetes mellitus with diabetic nephropathy, with long-term current use of insulin (H)  A1c improved from 11 to 8.2%.   FBG are 110-150, will increase lantus from 32 to 34 units.  No history of hypoglycemia.   - empagliflozin (JARDIANCE) 10 MG TABS tablet; Take 1 tablet (10 mg) by mouth daily.  - LANTUS SOLOSTAR 100 UNIT/ML soln; Inject 34 Units subcutaneously at bedtime.    Stage 3b chronic kidney disease (H)  Due to diabetic nephropathy.  Renal function at his baseline with creatinine of 2-2.5/GFR around 30.  Most recent urine albumin improved from 500 in 2023 to 142 in December 2024.  - Continue SGLT2 -Jardiance 10 mg daily  - Continue losartan 25 mg daily  - Have recommended referral to a kidney specialist but patient declines.  He prefers to stay in primary care at this time.       Anemia due to stage 3b chronic kidney disease (H)  Hgb baseline is around 9.  Today is 8.6, not much change from hospital discharge level of 8.9.  Have discussed concern about anemia (particularly in setting of coronary artery calcification as below).   - Albumin Random Urine Quantitative with Creat Ratio; Future  - Albumin Random Urine Quantitative with Creat Ratio    Benign essential hypertension  Unclear if he is currently taking medications, BP elevated today.  Encouraged Chace to  bring medications to next appointment.  - losartan (COZAAR) 25 MG tablet; Take 1 tablet (25 mg) by mouth daily.  - chlorthalidone (HYGROTON) 25 MG tablet; Take 1 tablet (25 mg) by mouth daily.     Transportation insecurity  Financial insecurity  Having financial constraints, particularly concerned about affording medication co-pays.  Also, he is unsure if he qualifies for medical rides.  He would like to talk to social work.   - Primary Care - Care Coordination Referral; Future      Coronary artery calcification  Noted on CT scan.  Asymptomatic.   - continue risk factor management with atorvastatin 40 mg daily and BP control.  Did not discuss ASA today.  Consider ASA after eradication of H pylori        Valente Mas is a 65 year old, presenting for the following health issues:  Hypertension (Follow up)        6/25/2025    10:20 AM   Additional Questions   Roomed by Wesley   Accompanied by N/A         6/25/2025    Information    services provided? Yes   Language Hmshon   Type of interpretation provided Face-to-face    name Long    [employer] Chace Lane, a 65-year-old male, is here for follow-up on his diabetes and blood pressure management. He previously had an H. pylori infection and was prescribed antibiotics, but he has not completed the course due to occasionally forgetting to take the medication. He started the antibiotics in April 2025. He reports no chest pain or difficulty breathing.    He has swelling in his right leg, extending to the middle of the calf, which has not been evaluated by ultrasound yet. The swelling does not cause pain. He is unable to drive, and his partner, who lives with him, is often busy, making it difficult to get to the hospital for the ultrasound.    Mr. Lane is experiencing financial difficulties, with no current source of income, and has not applied for disability. His poor eyesight prevents him from working, and he is seeking  "assistance with medication costs. He is taking his blood pressure medication but did not bring his medications or diabetes meter to the appointment. His blood sugar levels have been between 100 and 150, with no readings over 200 recently. He recalls higher blood sugar levels, between 300 and 400, following a back injury in April 2025. He is currently taking 32 units of insulin daily.                      Objective    BP (!) 150/74   Pulse 91   Temp 98.1  F (36.7  C) (Oral)   Resp 18   Ht 1.625 m (5' 3.98\")   Wt 64 kg (141 lb)   SpO2 99%   BMI 24.22 kg/m    Body mass index is 24.22 kg/m .  Physical Exam   GENERAL: alert and no distress  RESP: lungs clear to auscultation - no rales, rhonchi or wheezes  CV: regular rates and rhythm, normal S1 S2, no S3 or S4, no murmur, click or rub, and 1-2+ right lower extremity pitting edema to knee    SKIN: dry skin of lower legs.            The longitudinal plan of care for the diagnosis(es)/condition(s) as documented were addressed during this visit. Due to the added complexity in care, I will continue to support Chace in the subsequent management and with ongoing continuity of care.a    Signed Electronically by: Debbie Spangler MD    "

## 2025-06-26 ENCOUNTER — RESULTS FOLLOW-UP (OUTPATIENT)
Dept: FAMILY MEDICINE | Facility: CLINIC | Age: 65
End: 2025-06-26

## 2025-07-30 ENCOUNTER — TELEPHONE (OUTPATIENT)
Dept: FAMILY MEDICINE | Facility: CLINIC | Age: 65
End: 2025-07-30
Payer: COMMERCIAL

## 2025-07-30 NOTE — TELEPHONE ENCOUNTER
Patient Quality Outreach    Patient is due for the following:   Hypertension -  Hypertension follow-up visit  IVD  -  Aspirin, Statin, and BP Check    Action(s) Taken:   Patient was scheduled for Hypertension with Dr. Kidd    Type of outreach:    Return call to schedule/ missed appointment w/ Dr. Spangler     Questions for provider review:    None         Lavern Jacobs  Chart routed to None.

## 2025-08-06 ENCOUNTER — RESULTS FOLLOW-UP (OUTPATIENT)
Dept: FAMILY MEDICINE | Facility: CLINIC | Age: 65
End: 2025-08-06

## 2025-08-06 ENCOUNTER — OFFICE VISIT (OUTPATIENT)
Dept: FAMILY MEDICINE | Facility: CLINIC | Age: 65
End: 2025-08-06
Payer: COMMERCIAL

## 2025-08-06 VITALS
HEIGHT: 63 IN | TEMPERATURE: 98.2 F | RESPIRATION RATE: 16 BRPM | HEART RATE: 82 BPM | WEIGHT: 140 LBS | DIASTOLIC BLOOD PRESSURE: 71 MMHG | BODY MASS INDEX: 24.8 KG/M2 | OXYGEN SATURATION: 100 % | SYSTOLIC BLOOD PRESSURE: 134 MMHG

## 2025-08-06 DIAGNOSIS — Z79.4 TYPE 2 DIABETES MELLITUS WITH DIABETIC NEPHROPATHY, WITH LONG-TERM CURRENT USE OF INSULIN (H): Primary | ICD-10-CM

## 2025-08-06 DIAGNOSIS — M21.961 DEFORMITY OF RIGHT FOOT: ICD-10-CM

## 2025-08-06 DIAGNOSIS — N18.32 STAGE 3B CHRONIC KIDNEY DISEASE (H): ICD-10-CM

## 2025-08-06 DIAGNOSIS — E11.21 TYPE 2 DIABETES MELLITUS WITH DIABETIC NEPHROPATHY, WITH LONG-TERM CURRENT USE OF INSULIN (H): Primary | ICD-10-CM

## 2025-08-06 DIAGNOSIS — I10 BENIGN ESSENTIAL HYPERTENSION: ICD-10-CM

## 2025-08-06 LAB
ANION GAP SERPL CALCULATED.3IONS-SCNC: 7 MMOL/L (ref 3–14)
BUN SERPL-MCNC: 76 MG/DL (ref 7–30)
CALCIUM SERPL-MCNC: 9.5 MG/DL (ref 8.5–10.1)
CHLORIDE BLD-SCNC: 111 MMOL/L (ref 94–109)
CO2 SERPL-SCNC: 24 MMOL/L (ref 20–32)
CREAT SERPL-MCNC: 2.6 MG/DL (ref 0.66–1.25)
EGFRCR SERPLBLD CKD-EPI 2021: 27 ML/MIN/1.73M2
EST. AVERAGE GLUCOSE BLD GHB EST-MCNC: 174 MG/DL
GLUCOSE BLD-MCNC: 98 MG/DL (ref 70–99)
HBA1C MFR BLD: 7.7 % (ref 0–5.6)
POTASSIUM BLD-SCNC: 5.2 MMOL/L (ref 3.4–5.3)
SODIUM SERPL-SCNC: 142 MMOL/L (ref 135–145)

## 2025-08-06 RX ORDER — LOSARTAN POTASSIUM 25 MG/1
25 TABLET ORAL DAILY
Qty: 30 TABLET | Refills: 5 | Status: SHIPPED | OUTPATIENT
Start: 2025-08-06

## 2025-08-06 RX ORDER — CHLORTHALIDONE 25 MG/1
25 TABLET ORAL DAILY
Qty: 30 TABLET | Refills: 5 | Status: SHIPPED | OUTPATIENT
Start: 2025-08-06